# Patient Record
Sex: MALE | Race: BLACK OR AFRICAN AMERICAN | NOT HISPANIC OR LATINO | URBAN - METROPOLITAN AREA
[De-identification: names, ages, dates, MRNs, and addresses within clinical notes are randomized per-mention and may not be internally consistent; named-entity substitution may affect disease eponyms.]

---

## 2018-11-09 ENCOUNTER — EMERGENCY (EMERGENCY)
Facility: HOSPITAL | Age: 23
LOS: 0 days | Discharge: HOME | End: 2018-11-09
Attending: EMERGENCY MEDICINE | Admitting: EMERGENCY MEDICINE

## 2018-11-09 VITALS
DIASTOLIC BLOOD PRESSURE: 82 MMHG | HEART RATE: 72 BPM | TEMPERATURE: 98 F | RESPIRATION RATE: 18 BRPM | SYSTOLIC BLOOD PRESSURE: 124 MMHG | OXYGEN SATURATION: 100 %

## 2018-11-09 VITALS
TEMPERATURE: 97 F | SYSTOLIC BLOOD PRESSURE: 113 MMHG | HEART RATE: 66 BPM | OXYGEN SATURATION: 97 % | RESPIRATION RATE: 18 BRPM | DIASTOLIC BLOOD PRESSURE: 55 MMHG

## 2018-11-09 DIAGNOSIS — R56.9 UNSPECIFIED CONVULSIONS: ICD-10-CM

## 2018-11-09 LAB
ALBUMIN SERPL ELPH-MCNC: 4.4 G/DL — SIGNIFICANT CHANGE UP (ref 3.5–5.2)
ALP SERPL-CCNC: 50 U/L — SIGNIFICANT CHANGE UP (ref 30–115)
ALT FLD-CCNC: 12 U/L — SIGNIFICANT CHANGE UP (ref 0–41)
ANION GAP SERPL CALC-SCNC: 14 MMOL/L — SIGNIFICANT CHANGE UP (ref 7–14)
AST SERPL-CCNC: 21 U/L — SIGNIFICANT CHANGE UP (ref 0–41)
BASOPHILS # BLD AUTO: 0.02 K/UL — SIGNIFICANT CHANGE UP (ref 0–0.2)
BASOPHILS NFR BLD AUTO: 0.4 % — SIGNIFICANT CHANGE UP (ref 0–1)
BILIRUB SERPL-MCNC: 0.7 MG/DL — SIGNIFICANT CHANGE UP (ref 0.2–1.2)
BUN SERPL-MCNC: 11 MG/DL — SIGNIFICANT CHANGE UP (ref 10–20)
CALCIUM SERPL-MCNC: 9.9 MG/DL — SIGNIFICANT CHANGE UP (ref 8.5–10.1)
CHLORIDE SERPL-SCNC: 101 MMOL/L — SIGNIFICANT CHANGE UP (ref 98–110)
CO2 SERPL-SCNC: 27 MMOL/L — SIGNIFICANT CHANGE UP (ref 17–32)
CREAT SERPL-MCNC: 0.9 MG/DL — SIGNIFICANT CHANGE UP (ref 0.7–1.5)
EOSINOPHIL # BLD AUTO: 0.18 K/UL — SIGNIFICANT CHANGE UP (ref 0–0.7)
EOSINOPHIL NFR BLD AUTO: 3.3 % — SIGNIFICANT CHANGE UP (ref 0–8)
GLUCOSE SERPL-MCNC: 100 MG/DL — HIGH (ref 70–99)
HCT VFR BLD CALC: 39.7 % — LOW (ref 42–52)
HGB BLD-MCNC: 13.9 G/DL — LOW (ref 14–18)
IMM GRANULOCYTES NFR BLD AUTO: 0.4 % — HIGH (ref 0.1–0.3)
LYMPHOCYTES # BLD AUTO: 2.09 K/UL — SIGNIFICANT CHANGE UP (ref 1.2–3.4)
LYMPHOCYTES # BLD AUTO: 38.1 % — SIGNIFICANT CHANGE UP (ref 20.5–51.1)
MCHC RBC-ENTMCNC: 29 PG — SIGNIFICANT CHANGE UP (ref 27–31)
MCHC RBC-ENTMCNC: 35 G/DL — SIGNIFICANT CHANGE UP (ref 32–37)
MCV RBC AUTO: 82.7 FL — SIGNIFICANT CHANGE UP (ref 80–94)
MONOCYTES # BLD AUTO: 0.33 K/UL — SIGNIFICANT CHANGE UP (ref 0.1–0.6)
MONOCYTES NFR BLD AUTO: 6 % — SIGNIFICANT CHANGE UP (ref 1.7–9.3)
NEUTROPHILS # BLD AUTO: 2.84 K/UL — SIGNIFICANT CHANGE UP (ref 1.4–6.5)
NEUTROPHILS NFR BLD AUTO: 51.8 % — SIGNIFICANT CHANGE UP (ref 42.2–75.2)
NRBC # BLD: 0 /100 WBCS — SIGNIFICANT CHANGE UP (ref 0–0)
PLATELET # BLD AUTO: 204 K/UL — SIGNIFICANT CHANGE UP (ref 130–400)
POTASSIUM SERPL-MCNC: 4.4 MMOL/L — SIGNIFICANT CHANGE UP (ref 3.5–5)
POTASSIUM SERPL-SCNC: 4.4 MMOL/L — SIGNIFICANT CHANGE UP (ref 3.5–5)
PROT SERPL-MCNC: 6.9 G/DL — SIGNIFICANT CHANGE UP (ref 6–8)
RBC # BLD: 4.8 M/UL — SIGNIFICANT CHANGE UP (ref 4.7–6.1)
RBC # FLD: 11.9 % — SIGNIFICANT CHANGE UP (ref 11.5–14.5)
SODIUM SERPL-SCNC: 142 MMOL/L — SIGNIFICANT CHANGE UP (ref 135–146)
WBC # BLD: 5.48 K/UL — SIGNIFICANT CHANGE UP (ref 4.8–10.8)
WBC # FLD AUTO: 5.48 K/UL — SIGNIFICANT CHANGE UP (ref 4.8–10.8)

## 2018-11-09 RX ORDER — LEVETIRACETAM 250 MG/1
1 TABLET, FILM COATED ORAL
Qty: 28 | Refills: 0 | OUTPATIENT
Start: 2018-11-09 | End: 2018-11-22

## 2018-11-09 RX ORDER — LEVETIRACETAM 250 MG/1
1000 TABLET, FILM COATED ORAL ONCE
Qty: 0 | Refills: 0 | Status: COMPLETED | OUTPATIENT
Start: 2018-11-09 | End: 2018-11-09

## 2018-11-09 RX ADMIN — LEVETIRACETAM 400 MILLIGRAM(S): 250 TABLET, FILM COATED ORAL at 13:03

## 2018-11-09 NOTE — ED PROVIDER NOTE - NSFOLLOWUPINSTRUCTIONS_ED_ALL_ED_FT
Please take 500mg of Keppra twice a day and follow up with neurology this week. Remember, when you call for your appointment, mention that you were in the ER.       Seizure    A seizure is abnormal electrical activity in the brain; the specific cause may or may not be found. Prior to a seizure you may experience a warning sensation (aura) that may include fear, nausea, dizziness, and visual changes such as flashing lights of spots. Common symptoms during the seizure may include an altered mental status, rhythmic jerking movements, drooling, grunting, loss of bladder or bowel control, or tongue biting. After a seizure, you may feel confused and sleepy.     Do not swim, drive, operate machinery, or engage in any risky activity during which a seizure could cause further injury to you or others. Teach friends and family what to do if you HAVE a seizure which includes laying you on the ground with your head on a cushion and turning you to the side to keep your breathing passages clear in case of vomiting.    SEEK IMMEDIATE MEDICAL CARE IF YOU HAVE ANY OF THE FOLLOWING SYMPTOMS: seizure lasting over 5 minutes, not waking up or persistent altered mental status after the seizure, or more frequent or worsening seizures.

## 2018-11-09 NOTE — ED PROVIDER NOTE - CARE PROVIDER_API CALL
Blake Conway), Neurology; Neuromuscular Medicine  [address] [place] [id number]  Phone: [phone]  Fax: [phone]

## 2018-11-09 NOTE — ED PROVIDER NOTE - OBJECTIVE STATEMENT
23y M w [facility] intracranial mass resection in August found 2/2 headaches and without complication presents for new onset seizure this morning. Pt has sibling w CP/epilepsy so mother knows what seizures look like. Mother describes a generalized tonic clonic seizure that lasted approximately 4 minutes and resolves spontaneously. Pt very confused afterward. Pt was on keppra until the end of September and had no issues postoperatively. Keppra was stopped at the end of Sept. Currently on no medications. Now complaining of mild headache and fatigue.   No trauma. +tongue biting  unknown histology of mass.

## 2018-11-09 NOTE — ED ADULT TRIAGE NOTE - CHIEF COMPLAINT QUOTE
Pt BIBA from home s/p seizure witnessed by mom, pt s/p tumor removal in September, was on Kepra that was recently d/c

## 2018-11-09 NOTE — ED PROVIDER NOTE - ATTENDING CONTRIBUTION TO CARE
24 yo M with hx of brain mass (? type) removed in August, ws on antiepileptic but no longer, s/p witness GCT seizure this am. Pt with no sx hx. No fver, no neck pain. Currently pt reports HA and feeling tired. Otherwise w/o complaints. On exam well appearing, NCAT, CN intact, 5/5 though out, neck supple. Ct labs and admit

## 2018-11-09 NOTE — ED PROVIDER NOTE - PHYSICAL EXAMINATION
Constitutional: Well developed, well nourished. NAD. Good general hygiene  Head: Atraumatic.  Eyes: PERRLA. EOMI without discomfort.   ENT: No nasal discharge. Mucous membranes moist. Tongue biting.   Neck: Supple. Painless ROM.  Cardiovascular: Regular rhythm. Regular rate. Normal S1 and S2. No murmurs. 2+ pulses in all extremities.   Pulmonary: Normal respiratory rate and effort. Lungs clear to auscultation bilaterally. No wheezing, rales, or rhonchi. Bilateral, equal lung expansion.   Abdominal: Soft. Nondistended. Nontender. No rebound or guarding.   Extremities: Pelvis stable. No lower extremity edema. Symmetric calves.  Skin: No rashes.   Neuro: AAOx3. CN 2-12 intact. Strength 5/5 in all extremities. Normal gait. Normal sensation. Normal finger-to-nose. Normal rapid repeated movements. Normal heel-to-shin. No nystagmus. No drift. Romberg negative. No word slurring.   Psych: Normal mood. Normal affect.

## 2018-11-09 NOTE — ED ADULT NURSE NOTE - NSIMPLEMENTINTERV_GEN_ALL_ED
Implemented All Fall with Harm Risk Interventions:  [place] to call system. Call bell, personal items and telephone within reach. Instruct patient to call for assistance. Room bathroom lighting operational. Non-slip footwear when patient is off stretcher. Physically safe environment: no spills, clutter or unnecessary equipment. Stretcher in lowest position, wheels locked, appropriate side rails in place. Provide visual cue, wrist band, yellow gown, etc. Monitor gait and stability. Monitor for mental status changes and reorient to person, place, and time. Review medications for side effects contributing to fall risk. Reinforce activity limits and safety measures with patient and family. Provide visual clues: red socks.

## 2018-11-09 NOTE — ED PROVIDER NOTE - NS ED ROS FT
Constitutional: No fever or chills. Normal appetite. No unintended weight loss.   Eyes: No vision changes.  ENT: No hearing changes. No ear pain. No sore throat.  Neck: No neck pain or stiffness.  Cardiovascular: No chest pain, palpitations, or edema.  Pulmonary: No cough or SOB. No hemoptysis.  Abdominal: No abdominal pain, nausea, vomiting, or diarrhea.   : No dysuria or frequency. No hematuria.   Neuro: No dizziness.  MS: No back pain. No calf pain/swelling.  Psych: No suicidal or homicidal ideations.

## 2018-11-09 NOTE — ED ADULT NURSE NOTE - OBJECTIVE STATEMENT
Pt with HX- Brain tumor removal on september this year report one episode of seizures activity this morning ,pt state he is taking weed, pt  no seizures activity at this time A/O times 4. Pt with HX- Brain tumor removal on August 2018, this year report one episode of seizures activity this morning ,pt state he is taking weed, pt  no seizures activity at this time A/O times 4.

## 2018-12-12 ENCOUNTER — INPATIENT (INPATIENT)
Facility: HOSPITAL | Age: 23
LOS: 1 days | Discharge: HOME | End: 2018-12-14
Attending: INTERNAL MEDICINE | Admitting: INTERNAL MEDICINE

## 2018-12-12 VITALS
RESPIRATION RATE: 16 BRPM | TEMPERATURE: 97 F | WEIGHT: 154.32 LBS | HEIGHT: 70 IN | HEART RATE: 68 BPM | DIASTOLIC BLOOD PRESSURE: 56 MMHG | SYSTOLIC BLOOD PRESSURE: 118 MMHG

## 2018-12-12 DIAGNOSIS — Z98.890 OTHER SPECIFIED POSTPROCEDURAL STATES: Chronic | ICD-10-CM

## 2018-12-12 RX ORDER — LEVETIRACETAM 250 MG/1
500 TABLET, FILM COATED ORAL ONCE
Qty: 0 | Refills: 0 | Status: COMPLETED | OUTPATIENT
Start: 2018-12-12 | End: 2018-12-12

## 2018-12-12 RX ORDER — LEVETIRACETAM 250 MG/1
500 TABLET, FILM COATED ORAL EVERY 12 HOURS
Qty: 0 | Refills: 0 | Status: DISCONTINUED | OUTPATIENT
Start: 2018-12-12 | End: 2018-12-13

## 2018-12-12 RX ORDER — HEPARIN SODIUM 5000 [USP'U]/ML
5000 INJECTION INTRAVENOUS; SUBCUTANEOUS EVERY 8 HOURS
Qty: 0 | Refills: 0 | Status: DISCONTINUED | OUTPATIENT
Start: 2018-12-12 | End: 2018-12-14

## 2018-12-12 RX ADMIN — LEVETIRACETAM 500 MILLIGRAM(S): 250 TABLET, FILM COATED ORAL at 12:16

## 2018-12-12 RX ADMIN — LEVETIRACETAM 500 MILLIGRAM(S): 250 TABLET, FILM COATED ORAL at 22:26

## 2018-12-12 NOTE — CONSULT NOTE ADULT - SUBJECTIVE AND OBJECTIVE BOX
Neurology/Epilepsy Consult:    DANIELA ROSADO 23y Male  [id number]    Patient is a 23y old right-handed Male who presents for elective VEEG monitoring    HPI: History obtained from patient, EMR reviewed        PAST MEDICAL & SURGICAL HISTORY:  Bening tumor  S/p left frontal craniotomy 2018  Seizure  Asthma        FAMILY HISTORY:  No seizures      Social History:     Denies ETOH  Marijuana - daily        Risk factors:  Born full-term, , no complications  Normal growth and development  No febrile seizures/CNS infections  Several sport-related head traumas, no loss of consciousness      Allergy:  No Known Allergies        Home Medications:  Keppra 500mg q12hrs (last dose yesterday evening)      MEDICATIONS  (STANDING):  levETIRAcetam 500 milliGRAM(s) Oral every 12 hours    MEDICATIONS  (PRN):  LORazepam   Injectable 2 milliGRAM(s) IV Push three times a day PRN generalized tonic-clonic seizure lasting longer than 2 minutes          T(F): 97 (18 @ 11:26), Max: 97 (18 @ 11:26)  HR: 68 (18 @ 11:26) (68 - 68)  BP: 118/56 (18 @ 11:26) (118/56 - 118/56)  RR: 16 (--18 @ 11:26) (16 - 16)  SpO2: --      Neurologic Examination:  General:  Appearance is consistent with chronologic age.  No abnormal facies.   General: The patient is oriented to person, place, time and date.  Recent and remote memory intact.  Follows 4-step directions. Fund of knowledge is intact and normal.  Language with normal repetition, comprehension and naming.  Nondysarthric.    Cranial nerves: EOMI w/o nystagmus, skew or reported double vision.  PERRL.  No ptosis/weakness of eyelid closure.  Facial sensation is normal with normal bite.  No facial asymmetry.  Hearing grossly intact b/l.  Palate elevates midline.  Tongue midline.  Motor examination:   Normal tone, bulk and range of motion.  No tenderness, twitching, tremors or involuntary movements.  Formal Muscle Strength Testin/5 UE; 5/5 LE.  No observable drift.  Reflexes:   2+ b/l pectoralis, biceps, triceps, brachioradialis, patella and Achilles.    Sensory examination:   Intact to light touch and pinprick, pain.  Cerebellum:   FTN/HKS intact with normal ANDREE in all limbs.  No dysmetria or dysdiadokinesia.  Gait narrow based and normal.          Neuroimaging:  [facility]:   < from: CT Head No Cont (18 @ 10:30) >    IMPRESSION:    1.  Status post tumor resection in August as per history.    2.  Left frontal craniotomy, underlying hyperdensity which may partially   reflect postsurgical change, and hypoattenuation within the left frontal   lobe with a cystic lesion in the left frontal periventricular white   matter.    3.  These findings are indeterminate in the postsurgical state. Recommend   correlation with the patient's previous outside imaging studies. If these   can be made available for direct comparison an addendum will be issued as   appropriate.    ALLI MAHMOOD M.D., ATTENDING RADIOLOGIST  This document has been electronically signed. 2018 10:48AM    < end of copied text >        REEG 12/10/2018 - normal      Assessment:  This is a 23y Male with h/o left frontal craniotomy 2018 for benign tumor resection. Patient was off Keppra when had a witnessed seizure episode last month, Keppra was restarted.       Discussed with Dr. Wong      Plan:   - VEEG monitoring for better characterization and treatment plan  - Seizure precautions  - Keppra 500mg x 1 stat (did not take AM dose today) - ordered and given  - Continue Keppra 500mg q12hrs for now  - Ativan 2mg IV PRN for generalized tonic-clonic seizure lasting longer than 2 minutes, or two consecutive seizures without return to baseline in-between (ordered)  - CBC, CMP, Mg, Keppra level trough (ordered)  - Keep Mg above 2    Plan discussed with patient in details, all questions answered Neurology/Epilepsy Consult:    DANIELA ROSADO 23y Male  MRN-[id number]    Patient is a 23y old right-handed Male who presents for elective VEEG monitoring    HPI: History obtained from patient, EMR reviewed  In 2018 patient started having headaches that were persistent. He went to an urgent care and was Rx a medication for that (patient does not remember the name). Headaches were getting progressively worse, and mid-2018 patient went to [facility] ED where CT showed left mass. Patient underwent left frontal craniotomy for tumor resection that was found to be benign. He was put on Keppra 500mg q12hr prophylaxis that he continued taking as recommended until mid September. He was doing well postoperatively until 2018 when came to [facility] ED s/p witnessed seizure. That morning patient had GTC seizure out of sleep witnessed by mother. The seizure lasted 3-4 minutes and was followed by confusion. No incontinence, no Ward's paralysis reported. Patient recalls waking up in the ED feeling tired with headache. Patient's exam was reported as normal in the ED, CT head was done and showed post-surgical changes. Patient was re-started on Keppra 500mg q12hrs, and was recommended neurology follow up. He was evaluated by Dr. Wong last week and referred for in-patient VEEG monitoring. Patient states he was taking Keppra regularly since the seizure, took last dose yesterday evening.      PAST MEDICAL & SURGICAL HISTORY:  Bening tumor  S/p left frontal craniotomy 2018  Seizure  Asthma        FAMILY HISTORY:  Nephew - CP, epilepsy      Social History:     Denies ETOH  Marijuana - daily        Risk factors:  Born full-term, , no complications  Normal growth and development  No febrile seizures/CNS infections  Several sport-related head traumas, no loss of consciousness      Allergy:  No Known Allergies        Home Medications:  Keppra 500mg q12hrs (last dose yesterday evening)      MEDICATIONS  (STANDING):  levETIRAcetam 500 milliGRAM(s) Oral every 12 hours    MEDICATIONS  (PRN):  LORazepam   Injectable 2 milliGRAM(s) IV Push three times a day PRN generalized tonic-clonic seizure lasting longer than 2 minutes          T(F): 97 (18 @ 11:26), Max: 97 (18 @ 11:26)  HR: 68 (12-12-18 @ 11:26) (68 - 68)  BP: 118/56 (18 @ 11:26) (118/56 - 118/56)  RR: 16 (1218 @ 11:26) (16 - 16)  SpO2: --      Neurologic Examination:  General:  Appearance is consistent with chronologic age.  No abnormal facies.   General: The patient is oriented to person, place, time and date.  Recent and remote memory intact.  Follows 4-step directions. Fund of knowledge is intact and normal.  Language with normal repetition, comprehension and naming.  Nondysarthric.    Cranial nerves: EOMI w/o nystagmus, skew or reported double vision.  PERRL.  No ptosis/weakness of eyelid closure.  Facial sensation is normal with normal bite.  No facial asymmetry.  Hearing grossly intact b/l.  Palate elevates midline.  Tongue midline.  Motor examination:   Normal tone, bulk and range of motion.  No tenderness, twitching, tremors or involuntary movements.  Formal Muscle Strength Testin/5 UE; 5/5 LE.  No observable drift.  Reflexes:   2+ b/l pectoralis, biceps, triceps, brachioradialis, patella and Achilles.    Sensory examination:   Intact to light touch and pinprick, pain.  Cerebellum:   FTN/HKS intact with normal ANDREE in all limbs.  No dysmetria or dysdiadokinesia.  Gait narrow based and normal.          [facility]   < [facility]: CT Head No Cont (18 @ 10:30) >    IMPRESSION:    1.  Status post tumor resection in August as per history.    2.  Left frontal craniotomy, underlying hyperdensity which may partially   reflect postsurgical change, and hypoattenuation within the left frontal   lobe with a cystic lesion in the left frontal periventricular white   matter.    3.  These findings are indeterminate in the postsurgical state. Recommend   correlation with the patient's previous outside imaging studies. If these   can be made available for direct comparison an addendum will be issued as   appropriate.    ALLI MAHMOOD M.D., ATTENDING RADIOLOGIST  This document has been electronically signed. 2018 10:48AM    < end of copied text >        REEG 12/10/2018 - normal      Assessment:  This is a 23y Male with h/o left frontal craniotomy 2018 for benign tumor resection. Patient was off Keppra when had a witnessed seizure episode last month, Keppra was restarted.       Discussed with Dr. Wong      Plan:   - VEEG monitoring for better characterization and treatment plan  - Seizure precautions  - Keppra 500mg x 1 stat (did not take AM dose today) - ordered and given  - Continue Keppra 500mg q12hrs for now  - Ativan 2mg IV PRN for generalized tonic-clonic seizure lasting longer than 2 minutes, or two consecutive seizures without return to baseline in-between (ordered)  - CBC, CMP, Mg, Keppra level trough (ordered)  - Keep Mg above 2    Plan discussed with patient in details, all questions answered

## 2018-12-12 NOTE — H&P ADULT - ASSESSMENT
24 yo M with MHx of intracranial tumor, s/p craniotomy in 08/2018 at [facility] presented for evaluation of seizures. Pt had first episode of seizures in November, he was not on AED at that moment, but after that started on Keppra 500 mg BID. Pt was seen by neurologist in the clinic and referred for inpatient VEEG. Currently no complaints.     # Seizure  - VEEG  - cont Keppra 500 mg BID  - neurology eval  - monitor electrolytes    DVT ppx

## 2018-12-12 NOTE — CONSULT NOTE ADULT - CONSULT REASON
VEEG monitoring for better characterization and treatment plan VEEG monitoring for further  characterization / risk assessment and treatment plan

## 2018-12-12 NOTE — H&P ADULT - NSHPLABSRESULTS_GEN_ALL_CORE
no labs today.     CT brain 11/09/2018: IMPRESSION:    1.  Status post tumor resection in August as per history.    2.  Left frontal craniotomy, underlying hyperdensity which may partially   reflect postsurgical change, and hypoattenuation within the left frontal   lobe with a cystic lesion in the left frontal periventricular white   matter.    3.  These findings are indeterminate in the postsurgical state. Recommend   correlation with the patient's previous outside imaging studies. If these   can be made available for direct comparison an addendum will be issued as   appropriate.

## 2018-12-12 NOTE — H&P ADULT - NSHPPHYSICALEXAM_GEN_ALL_CORE
Physical exam:  GENERAL: NAD,   HEENT: PERRL  NECK: supple, no JVD  RESP: CTA b/l, no crackles, rhonchi  CVS: S1S2, RRR  GI: abdomen soft NT, ND  Extremities:  no c/c/edema  NEURO: AOx3, no focal deficit  H/L: no enlarged LN noted

## 2018-12-12 NOTE — H&P ADULT - HISTORY OF PRESENT ILLNESS
24 yo M with MHx of intracranial tumor, s/p craniotomy in 08/2018 at [facility] presented for evaluation of seizures. Pt had first episode of seizures in November, he was not on AED at that moment, but after that started on Keppra 500 mg BID. Pt was seen by neurologist in the clinic ad referred for inpatient VEEG. Currently no complaints.

## 2018-12-12 NOTE — CONSULT NOTE ADULT - ATTENDING COMMENTS
Agree with the history and plan  He does clearly have risk for recurrent seizure although he is not convinced  will start the monitoring  No change in AED  Seizure precaution

## 2018-12-13 PROBLEM — D49.6 NEOPLASM OF UNSPECIFIED BEHAVIOR OF BRAIN: Chronic | Status: ACTIVE | Noted: 2018-11-09

## 2018-12-13 LAB
ALBUMIN SERPL ELPH-MCNC: 4.6 G/DL — SIGNIFICANT CHANGE UP (ref 3.5–5.2)
ALP SERPL-CCNC: 41 U/L — SIGNIFICANT CHANGE UP (ref 30–115)
ALT FLD-CCNC: 11 U/L — SIGNIFICANT CHANGE UP (ref 0–41)
ANION GAP SERPL CALC-SCNC: 11 MMOL/L — SIGNIFICANT CHANGE UP (ref 7–14)
AST SERPL-CCNC: 16 U/L — SIGNIFICANT CHANGE UP (ref 0–41)
BASOPHILS # BLD AUTO: 0.02 K/UL — SIGNIFICANT CHANGE UP (ref 0–0.2)
BASOPHILS NFR BLD AUTO: 0.4 % — SIGNIFICANT CHANGE UP (ref 0–1)
BILIRUB SERPL-MCNC: 1.2 MG/DL — SIGNIFICANT CHANGE UP (ref 0.2–1.2)
BUN SERPL-MCNC: 12 MG/DL — SIGNIFICANT CHANGE UP (ref 10–20)
CALCIUM SERPL-MCNC: 9.8 MG/DL — SIGNIFICANT CHANGE UP (ref 8.5–10.1)
CHLORIDE SERPL-SCNC: 103 MMOL/L — SIGNIFICANT CHANGE UP (ref 98–110)
CO2 SERPL-SCNC: 27 MMOL/L — SIGNIFICANT CHANGE UP (ref 17–32)
CREAT SERPL-MCNC: 1 MG/DL — SIGNIFICANT CHANGE UP (ref 0.7–1.5)
EOSINOPHIL # BLD AUTO: 0.11 K/UL — SIGNIFICANT CHANGE UP (ref 0–0.7)
EOSINOPHIL NFR BLD AUTO: 2.4 % — SIGNIFICANT CHANGE UP (ref 0–8)
GLUCOSE SERPL-MCNC: 87 MG/DL — SIGNIFICANT CHANGE UP (ref 70–99)
HCT VFR BLD CALC: 43 % — SIGNIFICANT CHANGE UP (ref 42–52)
HGB BLD-MCNC: 15 G/DL — SIGNIFICANT CHANGE UP (ref 14–18)
IMM GRANULOCYTES NFR BLD AUTO: 0.2 % — SIGNIFICANT CHANGE UP (ref 0.1–0.3)
LYMPHOCYTES # BLD AUTO: 2.64 K/UL — SIGNIFICANT CHANGE UP (ref 1.2–3.4)
LYMPHOCYTES # BLD AUTO: 58.3 % — HIGH (ref 20.5–51.1)
MAGNESIUM SERPL-MCNC: 2.1 MG/DL — SIGNIFICANT CHANGE UP (ref 1.8–2.4)
MCHC RBC-ENTMCNC: 29.2 PG — SIGNIFICANT CHANGE UP (ref 27–31)
MCHC RBC-ENTMCNC: 34.9 G/DL — SIGNIFICANT CHANGE UP (ref 32–37)
MCV RBC AUTO: 83.7 FL — SIGNIFICANT CHANGE UP (ref 80–94)
MONOCYTES # BLD AUTO: 0.22 K/UL — SIGNIFICANT CHANGE UP (ref 0.1–0.6)
MONOCYTES NFR BLD AUTO: 4.9 % — SIGNIFICANT CHANGE UP (ref 1.7–9.3)
NEUTROPHILS # BLD AUTO: 1.53 K/UL — SIGNIFICANT CHANGE UP (ref 1.4–6.5)
NEUTROPHILS NFR BLD AUTO: 33.8 % — LOW (ref 42.2–75.2)
NRBC # BLD: 0 /100 WBCS — SIGNIFICANT CHANGE UP (ref 0–0)
PLATELET # BLD AUTO: 197 K/UL — SIGNIFICANT CHANGE UP (ref 130–400)
POTASSIUM SERPL-MCNC: 4.4 MMOL/L — SIGNIFICANT CHANGE UP (ref 3.5–5)
POTASSIUM SERPL-SCNC: 4.4 MMOL/L — SIGNIFICANT CHANGE UP (ref 3.5–5)
PROT SERPL-MCNC: 6.6 G/DL — SIGNIFICANT CHANGE UP (ref 6–8)
RBC # BLD: 5.14 M/UL — SIGNIFICANT CHANGE UP (ref 4.7–6.1)
RBC # FLD: 12 % — SIGNIFICANT CHANGE UP (ref 11.5–14.5)
SODIUM SERPL-SCNC: 141 MMOL/L — SIGNIFICANT CHANGE UP (ref 135–146)
WBC # BLD: 4.53 K/UL — LOW (ref 4.8–10.8)
WBC # FLD AUTO: 4.53 K/UL — LOW (ref 4.8–10.8)

## 2018-12-13 RX ORDER — LEVETIRACETAM 250 MG/1
250 TABLET, FILM COATED ORAL EVERY 12 HOURS
Qty: 0 | Refills: 0 | Status: DISCONTINUED | OUTPATIENT
Start: 2018-12-13 | End: 2018-12-14

## 2018-12-13 RX ADMIN — LEVETIRACETAM 250 MILLIGRAM(S): 250 TABLET, FILM COATED ORAL at 21:00

## 2018-12-13 RX ADMIN — LEVETIRACETAM 250 MILLIGRAM(S): 250 TABLET, FILM COATED ORAL at 10:17

## 2018-12-13 NOTE — PROGRESS NOTE ADULT - SUBJECTIVE AND OBJECTIVE BOX
Epilepsy Attending Note:     DANIELA ROSADO    23y Male  MRN [id number]    Vital Signs Last 24 Hrs  T(C): 36.3 (13 Dec 2018 05:13), Max: 36.9 (12 Dec 2018 22:12)  T(F): 97.3 (13 Dec 2018 05:13), Max: 98.4 (12 Dec 2018 22:12)  HR: 54 (13 Dec 2018 05:13) (52 - 68)  BP: 107/53 (13 Dec 2018 05:13) (107/53 - 122/73)  BP(mean): --  RR: 16 (13 Dec 2018 05:13) (16 - 16)  SpO2: --                          15.0   4.53  )-----------( 197      ( 13 Dec 2018 07:42 )             43.0       12-13    141  |  103  |  12  ----------------------------<  87  4.4   |  27  |  1.0    Ca    9.8      13 Dec 2018 07:42  Mg     2.1     12-13    TPro  6.6  /  Alb  4.6  /  TBili  1.2  /  DBili  x   /  AST  16  /  ALT  11  /  AlkPhos  41  12-13      MEDICATIONS  (STANDING):  heparin  Injectable 5000 Unit(s) SubCutaneous every 8 hours  levETIRAcetam 250 milliGRAM(s) Oral every 12 hours    MEDICATIONS  (PRN):  LORazepam   Injectable 2 milliGRAM(s) IV Push three times a day PRN generalized tonic-clonic seizure lasting longer than 2 minutes            VEEG in the last 24 hours:    Background---8-9    Focal and generalized slowing---left FC/anterior quadrant focal slowing    Interictal activity---left FC sharp transients    Events--none    Seizures--none    Impression:  abnormal due to the above    Plan - decrease Keppra to 250 bid   Seizure precaution

## 2018-12-13 NOTE — PROGRESS NOTE ADULT - SUBJECTIVE AND OBJECTIVE BOX
DANIELA ROSADO    Patient is a 23y old  Male who presents with a chief complaint of VEEG (13 Dec 2018 09:14)    HPI:  22 yo M with MHx of intracranial tumor, s/p craniotomy in 08/2018 at [facility] presented for evaluation of seizures. Pt had first episode of seizures in November, he was not on AED at that moment, but after that started on Keppra 500 mg BID. Pt was seen by neurologist in the clinic ad referred for inpatient VEEG. Currently no complaints. (12 Dec 2018 12:42)    INTERVAL HPI/OVERNIGHT EVENTS: no events    ROS: All ROS negative     PHYSICAL EXAM:  T(C): 36.4, Max: 36.9 (12-12-18 @ 22:12)  HR: 53 (52 - 54)  BP: 116/53 (107/53 - 122/73)  RR: 14 (14 - 16)  SpO2: --    GENERAL: NAD  NECK: Supple, No JVD  PULMONARY/CHEST: No rales, rhonchi, wheezing  CARDIOVASC: Regular rate and rhythm; No murmurs  GI/ABDOMEN: Soft, Nontender, Nondistended; Bowel sounds present  EXTREMITIES:  2+ Peripheral Pulses, No clubbing, cyanosis, or edema, no deformity. No calf tenderness b/l.  SKIN: No rashes or lesions  NERVOUS SYSTEM:  Alert & Oriented X3, no focal deficit     Consultant(s) Notes Reviewed by me.   Care Discussed with Consultants/Other Providers     LABS:                        15.0   4.53  )-----------( 197      ( 13 Dec 2018 07:42 )             43.0     12-13    141  |  103  |  12  ----------------------------<  87  4.4   |  27  |  1.0    Ca    9.8      13 Dec 2018 07:42  Mg     2.1     12-13    TPro  6.6  /  Alb  4.6  /  TBili  1.2  /  DBili  x   /  AST  16  /  ALT  11  /  AlkPhos  41  12-13        MEDICATIONS  (STANDING):  heparin  Injectable 5000 Unit(s) SubCutaneous every 8 hours  levETIRAcetam 250 milliGRAM(s) Oral every 12 hours    MEDICATIONS  (PRN):  LORazepam   Injectable 2 milliGRAM(s) IV Push three times a day PRN generalized tonic-clonic seizure lasting longer than 2 minutes

## 2018-12-13 NOTE — PROGRESS NOTE ADULT - ASSESSMENT
24 yo M with MHx of intracranial tumor, s/p craniotomy in 08/2018 at [facility] presented for evaluation of seizures. Pt had first episode of seizures in November, he was not on AED at that moment, but after that started on Keppra 500 mg BID. Pt was seen by neurologist in the clinic and referred for inpatient VEEG. Currently no complaints.     # Seizure  - VEEG continue  - Keppra decreased to 250 BID  - neurology on board  - monitor electrolytes  - seizure precautions    DVT ppx

## 2018-12-14 ENCOUNTER — TRANSCRIPTION ENCOUNTER (OUTPATIENT)
Age: 23
End: 2018-12-14

## 2018-12-14 VITALS
TEMPERATURE: 96 F | DIASTOLIC BLOOD PRESSURE: 51 MMHG | RESPIRATION RATE: 16 BRPM | HEART RATE: 58 BPM | SYSTOLIC BLOOD PRESSURE: 102 MMHG

## 2018-12-14 RX ORDER — LEVETIRACETAM 250 MG/1
500 TABLET, FILM COATED ORAL EVERY 12 HOURS
Qty: 0 | Refills: 0 | Status: DISCONTINUED | OUTPATIENT
Start: 2018-12-14 | End: 2018-12-14

## 2018-12-14 RX ORDER — LEVETIRACETAM 250 MG/1
1 TABLET, FILM COATED ORAL
Qty: 60 | Refills: 5
Start: 2018-12-14 | End: 2019-06-11

## 2018-12-14 RX ADMIN — LEVETIRACETAM 500 MILLIGRAM(S): 250 TABLET, FILM COATED ORAL at 10:37

## 2018-12-14 NOTE — PROGRESS NOTE ADULT - SUBJECTIVE AND OBJECTIVE BOX
Epilepsy NP Note:    Follow up neurology appointment is scheduled with Dr. Wong  for February 19, 2019 at 2:20 pm.    [address] [place] [address], [address]  [phone]    Rx for Keppra 500mg q12hrs sent to the pharmacy.    Also given Rx for blood test to be done prior to follow up (CBC, CMP, Keppra level trough).    Information is given to the patient and hospitalist.

## 2018-12-14 NOTE — PROGRESS NOTE ADULT - SUBJECTIVE AND OBJECTIVE BOX
Epilepsy Attending Note:     DANIELA ROSADO    23y Male  MRN [id number]    Vital Signs Last 24 Hrs  T(C): 35.8 (14 Dec 2018 05:42), Max: 36.7 (13 Dec 2018 22:09)  T(F): 96.5 (14 Dec 2018 05:42), Max: 98.1 (13 Dec 2018 22:09)  HR: 58 (14 Dec 2018 05:42) (53 - 58)  BP: 102/51 (14 Dec 2018 05:42) (102/51 - 151/69)  BP(mean): --  RR: 16 (14 Dec 2018 05:42) (14 - 16)  SpO2: --                          15.0   4.53  )-----------( 197      ( 13 Dec 2018 07:42 )             43.0       12-13    141  |  103  |  12  ----------------------------<  87  4.4   |  27  |  1.0    Ca    9.8      13 Dec 2018 07:42  Mg     2.1     12-13    TPro  6.6  /  Alb  4.6  /  TBili  1.2  /  DBili  x   /  AST  16  /  ALT  11  /  AlkPhos  41  12-13      MEDICATIONS  (STANDING):  heparin  Injectable 5000 Unit(s) SubCutaneous every 8 hours  levETIRAcetam 500 milliGRAM(s) Oral every 12 hours    MEDICATIONS  (PRN):  LORazepam   Injectable 2 milliGRAM(s) IV Push three times a day PRN generalized tonic-clonic seizure lasting longer than 2 minutes            VEEG in the last 24 hours:    Background---------8-9 hz    Focal and generalized slowing---left anterior quadrants slowing    Interictal activity---small number of left FC sharps    Events---none    Seizures--none    Impression:-- abnormal as above    Plan - continue Keppra at 500 bid           F/u with levels

## 2018-12-14 NOTE — DISCHARGE NOTE ADULT - CARE PROVIDERS DIRECT ADDRESSES
,[email or web address],DirectAddress_Unknown ,[email or web address],DirectAddress_Unknown,DirectAddress_Unknown

## 2018-12-14 NOTE — DISCHARGE NOTE ADULT - PROVIDER TOKENS
TOKEN:'[id number]',FREE:[LAST:[pmd],PHONE:[(   )    -],FAX:[(   )    -]] TOKEN:'[id number]',FREE:[LAST:[pmd],PHONE:[(   )    -],FAX:[(   )    -]],FREE:[LAST:[Dr. Alvarado, neurosurgery at [facility]],PHONE:[(   )    -],FAX:[(   )    -]]

## 2018-12-14 NOTE — PROGRESS NOTE ADULT - SUBJECTIVE AND OBJECTIVE BOX
DANIELA ROSADO    Patient is a 23y old  Male who presents with a chief complaint of VEEG (14 Dec 2018 11:18)    HPI:  24 yo M with MHx of intracranial tumor, s/p craniotomy in 08/2018 at [facility] presented for evaluation of seizures. Pt had first episode of seizures in November, he was not on AED at that moment, but after seizure started on Keppra 500 mg BID. Pt was seen by neurologist in the clinic ad referred for inpatient VEEG. Currently no complaints. (12 Dec 2018 12:42)    INTERVAL HPI/OVERNIGHT EVENTS: no events    ROS: All ROS negative    PHYSICAL EXAM:  T(C): 35.8, Max: 36.7 (12-13-18 @ 22:09)  HR: 58 (53 - 58)  BP: 102/51 (102/51 - 151/69)  RR: 16 (14 - 16)  SpO2: --    GENERAL: NAD  NECK: Supple, No JVD  PULMONARY/CHEST: No rales, rhonchi, wheezing  CARDIOVASC: Regular rate and rhythm; No murmurs  GI/ABDOMEN: Soft, Nontender, Nondistended; Bowel sounds present  EXTREMITIES:  2+ Peripheral Pulses, No clubbing, cyanosis, or edema, no deformity. No calf tenderness b/l.  SKIN: No rashes or lesions  NERVOUS SYSTEM:  Alert & Oriented X3, no focal deficit     Consultant(s) Notes Reviewed by me.   Care Discussed with Consultants/Other Providers     LABS: no new labs today    MEDICATIONS  (STANDING):  heparin  Injectable 5000 Unit(s) SubCutaneous every 8 hours  levETIRAcetam 500 milliGRAM(s) Oral every 12 hours    MEDICATIONS  (PRN):  LORazepam   Injectable 2 milliGRAM(s) IV Push three times a day PRN generalized tonic-clonic seizure lasting longer than 2 minutes

## 2018-12-14 NOTE — DISCHARGE NOTE ADULT - HOSPITAL COURSE
24 yo M with MHx of intracranial tumor, s/p craniotomy in 08/2018 at [facility] presented for evaluation of seizures. Pt had first episode of seizures in November, he was not on AED at that moment, but after seizure episode  was started on Keppra 500 mg BID. Pt was seen by neurologist in the clinic ad referred for inpatient VEEG.   VEEG completed, Keppra kept at dose of 500 mg BID.  Pt is clinically stable for discharge home, medications sent to his pharmacy by neurology PA, appointment to neurologist made. Pt advised to follow up with PMD.

## 2018-12-14 NOTE — DISCHARGE NOTE ADULT - CARE PLAN
Principal Discharge DX:	Seizure  Goal:	control of symptoms  Assessment and plan of treatment:	continue Keppra, follow up with Dr. Wong on February 19, 2019 at 2:20 pm.

## 2018-12-14 NOTE — DISCHARGE NOTE ADULT - CARE PROVIDER_API CALL
Ray Wong), Neurology  [address] [place] [address]  Phone: [phone]  Fax: [phone]    pmd,   Phone: (   )    -  Fax: (   )    - Ray Wong), Neurology  [address] [place] [address]  [address] [place] [address]  Phone: [phone]  Fax: [phone]    pmd,   Phone: (   )    -  Fax: (   )    -    Dr. Alvarado, neurosurgery at [facility],   Phone: (   )    -  Fax: (   )    -

## 2018-12-14 NOTE — DISCHARGE NOTE ADULT - MEDICATION SUMMARY - MEDICATIONS TO TAKE
I will START or STAY ON the medications listed below when I get home from the hospital:    Keppra 500 mg oral tablet  -- 1 tab(s) by mouth every 12 hours   -- Check with your doctor before becoming pregnant.  It is very important that you take or use this exactly as directed.  Do not skip doses or discontinue unless directed by your doctor.  May cause drowsiness or dizziness.  Obtain medical advice before taking any non-prescription drugs as some may affect the action of this medication.  Swallow whole.  Do not crush.  This drug may impair the ability to drive or operate machinery.  Use care until you become familiar with its effects.    -- Indication: For Seizure

## 2018-12-14 NOTE — DISCHARGE NOTE ADULT - PATIENT PORTAL LINK FT
You can access the [facility] Patient Portal, offered by [facility], by registering with the following website: [email or web address]

## 2018-12-14 NOTE — DISCHARGE NOTE ADULT - MEDICATION SUMMARY - MEDICATIONS TO STOP TAKING
I will STOP taking the medications listed below when I get home from the hospital:    levETIRAcetam 500 mg oral tablet  -- 1 tab(s) by mouth 2 times a day   -- Check with your doctor before becoming pregnant.  It is very important that you take or use this exactly as directed.  Do not skip doses or discontinue unless directed by your doctor.  May cause drowsiness or dizziness.  Obtain medical advice before taking any non-prescription drugs as some may affect the action of this medication.  Swallow whole.  Do not crush.  This drug may impair the ability to drive or operate machinery.  Use care until you become familiar with its effects.

## 2018-12-14 NOTE — PROGRESS NOTE ADULT - ASSESSMENT
24 yo M with MHx of intracranial tumor, s/p craniotomy in 08/2018 at [facility] presented for evaluation of seizures. Pt had first episode of seizures in November, he was not on AED at that moment, but after that started on Keppra 500 mg BID. Pt was seen by neurologist in the clinic and referred for inpatient VEEG. Currently no complaints.     # Seizure  - VEEG completed, reviewed by neurologist, Keppra increased to 500 mg BID, Rx sent to the pharmacy    pt is clinically stable for discharge home today with OP follow up with neurologist, his PMD and neurosurgeon in [facility]

## 2018-12-15 LAB — LEVETIRACETAM SERPL-MCNC: 8.7 MCG/ML — LOW (ref 12–46)

## 2018-12-18 DIAGNOSIS — G40.909 EPILEPSY, UNSPECIFIED, NOT INTRACTABLE, WITHOUT STATUS EPILEPTICUS: ICD-10-CM

## 2018-12-18 DIAGNOSIS — Z86.011 PERSONAL HISTORY OF BENIGN NEOPLASM OF THE BRAIN: ICD-10-CM

## 2018-12-18 DIAGNOSIS — J45.20 MILD INTERMITTENT ASTHMA, UNCOMPLICATED: ICD-10-CM

## 2018-12-18 DIAGNOSIS — Z28.21 IMMUNIZATION NOT CARRIED OUT BECAUSE OF PATIENT REFUSAL: ICD-10-CM

## 2019-05-09 PROBLEM — R56.9 UNSPECIFIED CONVULSIONS: Chronic | Status: ACTIVE | Noted: 2018-12-12

## 2019-05-09 PROBLEM — J45.20 MILD INTERMITTENT ASTHMA, UNCOMPLICATED: Chronic | Status: ACTIVE | Noted: 2018-12-12

## 2019-05-28 PROBLEM — Z00.00 ENCOUNTER FOR PREVENTIVE HEALTH EXAMINATION: Status: ACTIVE | Noted: 2019-05-28

## 2019-08-14 ENCOUNTER — APPOINTMENT (OUTPATIENT)
Dept: INTERNAL MEDICINE | Facility: CLINIC | Age: 24
End: 2019-08-14

## 2019-08-20 ENCOUNTER — APPOINTMENT (OUTPATIENT)
Dept: NEUROLOGY | Facility: CLINIC | Age: 24
End: 2019-08-20

## 2019-11-18 ENCOUNTER — EMERGENCY (EMERGENCY)
Facility: HOSPITAL | Age: 24
LOS: 0 days | Discharge: HOME | End: 2019-11-19
Attending: EMERGENCY MEDICINE | Admitting: EMERGENCY MEDICINE
Payer: COMMERCIAL

## 2019-11-18 VITALS
WEIGHT: 139.99 LBS | HEART RATE: 74 BPM | TEMPERATURE: 98 F | OXYGEN SATURATION: 99 % | RESPIRATION RATE: 18 BRPM | HEIGHT: 70 IN | DIASTOLIC BLOOD PRESSURE: 72 MMHG | SYSTOLIC BLOOD PRESSURE: 128 MMHG

## 2019-11-18 DIAGNOSIS — Z98.890 OTHER SPECIFIED POSTPROCEDURAL STATES: Chronic | ICD-10-CM

## 2019-11-18 DIAGNOSIS — R51 HEADACHE: ICD-10-CM

## 2019-11-18 DIAGNOSIS — H57.89 OTHER SPECIFIED DISORDERS OF EYE AND ADNEXA: ICD-10-CM

## 2019-11-18 DIAGNOSIS — R09.81 NASAL CONGESTION: ICD-10-CM

## 2019-11-18 PROCEDURE — 99284 EMERGENCY DEPT VISIT MOD MDM: CPT

## 2019-11-19 VITALS
HEART RATE: 70 BPM | OXYGEN SATURATION: 98 % | RESPIRATION RATE: 20 BRPM | DIASTOLIC BLOOD PRESSURE: 70 MMHG | SYSTOLIC BLOOD PRESSURE: 124 MMHG

## 2019-11-19 LAB
ALBUMIN SERPL ELPH-MCNC: 4.9 G/DL — SIGNIFICANT CHANGE UP (ref 3.5–5.2)
ALP SERPL-CCNC: 53 U/L — SIGNIFICANT CHANGE UP (ref 30–115)
ALT FLD-CCNC: 10 U/L — SIGNIFICANT CHANGE UP (ref 0–41)
ANION GAP SERPL CALC-SCNC: 12 MMOL/L — SIGNIFICANT CHANGE UP (ref 7–14)
AST SERPL-CCNC: 15 U/L — SIGNIFICANT CHANGE UP (ref 0–41)
BASOPHILS # BLD AUTO: 0.03 K/UL — SIGNIFICANT CHANGE UP (ref 0–0.2)
BASOPHILS NFR BLD AUTO: 0.5 % — SIGNIFICANT CHANGE UP (ref 0–1)
BILIRUB SERPL-MCNC: 0.7 MG/DL — SIGNIFICANT CHANGE UP (ref 0.2–1.2)
BUN SERPL-MCNC: 17 MG/DL — SIGNIFICANT CHANGE UP (ref 10–20)
CALCIUM SERPL-MCNC: 9.7 MG/DL — SIGNIFICANT CHANGE UP (ref 8.5–10.1)
CHLORIDE SERPL-SCNC: 103 MMOL/L — SIGNIFICANT CHANGE UP (ref 98–110)
CO2 SERPL-SCNC: 27 MMOL/L — SIGNIFICANT CHANGE UP (ref 17–32)
CREAT SERPL-MCNC: 1.1 MG/DL — SIGNIFICANT CHANGE UP (ref 0.7–1.5)
EOSINOPHIL # BLD AUTO: 0.13 K/UL — SIGNIFICANT CHANGE UP (ref 0–0.7)
EOSINOPHIL NFR BLD AUTO: 2.3 % — SIGNIFICANT CHANGE UP (ref 0–8)
GLUCOSE SERPL-MCNC: 89 MG/DL — SIGNIFICANT CHANGE UP (ref 70–99)
HCT VFR BLD CALC: 39.8 % — LOW (ref 42–52)
HGB BLD-MCNC: 13.9 G/DL — LOW (ref 14–18)
IMM GRANULOCYTES NFR BLD AUTO: 0 % — LOW (ref 0.1–0.3)
LYMPHOCYTES # BLD AUTO: 3.52 K/UL — HIGH (ref 1.2–3.4)
LYMPHOCYTES # BLD AUTO: 62.1 % — HIGH (ref 20.5–51.1)
MCHC RBC-ENTMCNC: 30.2 PG — SIGNIFICANT CHANGE UP (ref 27–31)
MCHC RBC-ENTMCNC: 34.9 G/DL — SIGNIFICANT CHANGE UP (ref 32–37)
MCV RBC AUTO: 86.3 FL — SIGNIFICANT CHANGE UP (ref 80–94)
MONOCYTES # BLD AUTO: 0.29 K/UL — SIGNIFICANT CHANGE UP (ref 0.1–0.6)
MONOCYTES NFR BLD AUTO: 5.1 % — SIGNIFICANT CHANGE UP (ref 1.7–9.3)
NEUTROPHILS # BLD AUTO: 1.7 K/UL — SIGNIFICANT CHANGE UP (ref 1.4–6.5)
NEUTROPHILS NFR BLD AUTO: 30 % — LOW (ref 42.2–75.2)
NRBC # BLD: 0 /100 WBCS — SIGNIFICANT CHANGE UP (ref 0–0)
PLATELET # BLD AUTO: 165 K/UL — SIGNIFICANT CHANGE UP (ref 130–400)
POTASSIUM SERPL-MCNC: 4.7 MMOL/L — SIGNIFICANT CHANGE UP (ref 3.5–5)
POTASSIUM SERPL-SCNC: 4.7 MMOL/L — SIGNIFICANT CHANGE UP (ref 3.5–5)
PROT SERPL-MCNC: 7 G/DL — SIGNIFICANT CHANGE UP (ref 6–8)
RBC # BLD: 4.61 M/UL — LOW (ref 4.7–6.1)
RBC # FLD: 11.9 % — SIGNIFICANT CHANGE UP (ref 11.5–14.5)
SODIUM SERPL-SCNC: 142 MMOL/L — SIGNIFICANT CHANGE UP (ref 135–146)
WBC # BLD: 5.67 K/UL — SIGNIFICANT CHANGE UP (ref 4.8–10.8)
WBC # FLD AUTO: 5.67 K/UL — SIGNIFICANT CHANGE UP (ref 4.8–10.8)

## 2019-11-19 PROCEDURE — 70450 CT HEAD/BRAIN W/O DYE: CPT | Mod: 26

## 2019-11-19 RX ORDER — SODIUM CHLORIDE 9 MG/ML
1000 INJECTION, SOLUTION INTRAVENOUS ONCE
Refills: 0 | Status: COMPLETED | OUTPATIENT
Start: 2019-11-19 | End: 2019-11-19

## 2019-11-19 RX ORDER — METOCLOPRAMIDE HCL 10 MG
10 TABLET ORAL ONCE
Refills: 0 | Status: COMPLETED | OUTPATIENT
Start: 2019-11-19 | End: 2019-11-19

## 2019-11-19 RX ORDER — ACETAMINOPHEN 500 MG
650 TABLET ORAL ONCE
Refills: 0 | Status: COMPLETED | OUTPATIENT
Start: 2019-11-19 | End: 2019-11-19

## 2019-11-19 RX ADMIN — SODIUM CHLORIDE 1000 MILLILITER(S): 9 INJECTION, SOLUTION INTRAVENOUS at 01:24

## 2019-11-19 RX ADMIN — Medication 10 MILLIGRAM(S): at 01:30

## 2019-11-19 RX ADMIN — Medication 650 MILLIGRAM(S): at 03:00

## 2019-11-19 RX ADMIN — SODIUM CHLORIDE 1000 MILLILITER(S): 9 INJECTION, SOLUTION INTRAVENOUS at 03:00

## 2019-11-19 RX ADMIN — Medication 650 MILLIGRAM(S): at 01:31

## 2019-11-19 NOTE — ED PROVIDER NOTE - NS ED ROS FT
Eyes:  No visual changes, eye pain or discharge.  ENMT:  No hearing changes, pain, discharge or infections. No neck pain or stiffness.  Cardiac:  No chest pain, SOB or edema. No chest pain with exertion.  Respiratory:  No cough or respiratory distress. No hemoptysis. No history of asthma or RAD.  GI:  No nausea, vomiting, diarrhea or abdominal pain.  :  No dysuria, frequency or burning.  MS:  No myalgia, muscle weakness, joint pain or back pain.  Neuro: +headache. No weakness.  No LOC.  Skin:  No skin rash.   Endocrine: No history of thyroid disease or diabetes.

## 2019-11-19 NOTE — ED PROVIDER NOTE - ATTENDING CONTRIBUTION TO CARE
25 yo male with PMH cranial mass s/p craniectomy presented c/o headache. Pt c/o mild right sided headache x several days. Denied any focal weakness, paresthesias, visual changes or dizziness.  Associated with nasal congestion and eye tearing. Minimal relief with OTC meds.      VITAL SIGNS: noted  CONSTITUTIONAL: Well-developed; well-nourished; in no acute distress  HEAD: Normocephalic; atraumatic  EYES: PERRL, EOM intact; conjunctiva and sclera clear  ENT: No nasal discharge; airway clear. MMM  NECK: Supple; non tender. No anterior cervical lymphadenopathy noted  CARD: S1, S2 normal; no murmurs, gallops, or rubs. Regular rate and rhythm  RESP: CTAB/L, no wheezes, rales or rhonchi  ABD: Normal bowel sounds; soft; non-distended; non-tender; no hepatosplenomegaly. No CVA tenderness  EXT: Normal ROM. No calf tenderness or edema. Distal pulses intact  NEURO: AAO x 3, normal speech, no facial asymmetry, negative pronator drift, no ataxia, negative Romberg, no dysdiadokinesia, no nystagmus, sensory equal and intact.   SKIN: Skin exam is warm and dry   MS: No midline spinal tenderness

## 2019-11-19 NOTE — ED PROVIDER NOTE - PATIENT PORTAL LINK FT
You can access the FollowMyHealth Patient Portal offered by [facility] by registering at the following website: [email or web address]. By joining [employer]’s FollowMyHealth portal, you will also be able to view your health information using other applications (apps) compatible with our system.

## 2019-11-19 NOTE — ED PROVIDER NOTE - OBJECTIVE STATEMENT
24 y m pmh cranial mass s/p craniectomy 1 year ago pw headache. Achy, mild right sided headache for the past few days. Associated with nasal congestion and eye tearing. Tried tylenol at home with minimal relief. Denies fever, chills, n/v, back pain, neck pain, head trauma, cp, sob, seizure like activity.

## 2019-11-19 NOTE — ED PROVIDER NOTE - PHYSICAL EXAMINATION
CONSTITUTIONAL: Well-developed; well-nourished; in no acute distress.   SKIN: warm, dry  HEAD: Normocephalic; atraumatic.  EYES: PERRL, EOMI, normal sclera and conjunctiva   ENT: No nasal discharge; airway clear.  NECK: Supple; non tender.  CARD: S1, S2 normal; no murmurs, gallops, or rubs. Regular rate and rhythm.   RESP: No wheezes, rales or rhonchi.  ABD: soft ntnd  EXT: Normal ROM.  No clubbing, cyanosis or edema.   LYMPH: No acute cervical adenopathy.  NEURO: Alert, oriented, grossly unremarkable. No cranial nerve deficits, moving all extremities with normal strength/sensation. Ambulating with normal, steady gait. No speech deficits. Cerebellar movements intact.   PSYCH: Cooperative, appropriate.

## 2019-11-19 NOTE — ED PROVIDER NOTE - NSFOLLOWUPINSTRUCTIONS_ED_ALL_ED_FT
FOLLOW UP WITH YOUR DOCTOR THIS WEEK FOR REEVALUATION. [facility]ISE NEUROLOGY FOLLOW UP AS WELL.    RETURN TO ED IMMEDIATELY WITH ANY WORSENING SYMPTOMS, CHEST PAIN, SHORTNESS OF BREATH, FEVERS, WEAKNESS, DIZZINESS, PERSISTENT OR SEVERE HEADACHE OR ANY OTHER CONCERNS.     Headache    A headache is pain or discomfort felt around the head or neck area. The specific cause of a headache may not be found as there are many types including tension headaches, migraine headaches, and cluster headaches. Watch your condition for any changes. Things you can do to manage your pain include taking over the counter and prescription medications as instructed by your health care provider, lying down in a dark quiet room, limiting stress, getting regular sleep, and refraining from alcohol and tobacco products.    SEEK IMMEDIATE MEDICAL CARE IF YOU HAVE ANY OF THE FOLLOWING SYMPTOMS: fever, vomiting, stiff neck, loss of vision, problems with speech, muscle weakness, loss of balance, trouble walking, passing out, or confusion.

## 2019-11-19 NOTE — ED PROVIDER NOTE - CLINICAL SUMMARY MEDICAL DECISION MAKING FREE TEXT BOX
pt seen for headache, labs and imaging without any acute findings, pt sx resolved in ED, reported feeling well to go home. advised close follow up with neuro and pt agreed. Strict return precautions advised and pt verbalized understanding.

## 2019-11-19 NOTE — ED PROVIDER NOTE - NSFOLLOWUPCLINICS_GEN_ALL_ED_FT
[facility]  Neurology  [address] [place] [address], [facility] [address]  [place] [address]  Phone: [phone]  Fax:   Follow Up Time: 1-3 Days

## 2019-11-19 NOTE — ED ADULT NURSE NOTE - NSIMPLEMENTINTERV_GEN_ALL_ED
Implemented All Universal Safety Interventions:  [place] to call system. Call bell, personal items and telephone within reach. Instruct patient to call for assistance. Room bathroom lighting operational. Non-slip footwear when patient is off stretcher. Physically safe environment: no spills, clutter or unnecessary equipment. Stretcher in lowest position, wheels locked, appropriate side rails in place.

## 2019-12-02 ENCOUNTER — APPOINTMENT (OUTPATIENT)
Dept: NEUROLOGY | Facility: CLINIC | Age: 24
End: 2019-12-02

## 2020-12-04 ENCOUNTER — EMERGENCY (EMERGENCY)
Facility: HOSPITAL | Age: 25
LOS: 0 days | Discharge: HOME | End: 2020-12-04
Attending: EMERGENCY MEDICINE | Admitting: EMERGENCY MEDICINE
Payer: SELF-PAY

## 2020-12-04 VITALS
OXYGEN SATURATION: 100 % | SYSTOLIC BLOOD PRESSURE: 135 MMHG | TEMPERATURE: 98 F | RESPIRATION RATE: 16 BRPM | HEIGHT: 70 IN | DIASTOLIC BLOOD PRESSURE: 59 MMHG | WEIGHT: 139.99 LBS | HEART RATE: 66 BPM

## 2020-12-04 DIAGNOSIS — Z98.890 OTHER SPECIFIED POSTPROCEDURAL STATES: Chronic | ICD-10-CM

## 2020-12-04 DIAGNOSIS — Z79.899 OTHER LONG TERM (CURRENT) DRUG THERAPY: ICD-10-CM

## 2020-12-04 DIAGNOSIS — R51.9 HEADACHE, UNSPECIFIED: ICD-10-CM

## 2020-12-04 PROCEDURE — L9991: CPT

## 2020-12-10 ENCOUNTER — EMERGENCY (EMERGENCY)
Facility: HOSPITAL | Age: 25
LOS: 0 days | Discharge: HOME | End: 2020-12-10
Attending: STUDENT IN AN ORGANIZED HEALTH CARE EDUCATION/TRAINING PROGRAM | Admitting: STUDENT IN AN ORGANIZED HEALTH CARE EDUCATION/TRAINING PROGRAM
Payer: SELF-PAY

## 2020-12-10 VITALS
RESPIRATION RATE: 18 BRPM | SYSTOLIC BLOOD PRESSURE: 135 MMHG | HEART RATE: 82 BPM | HEIGHT: 70 IN | DIASTOLIC BLOOD PRESSURE: 70 MMHG | WEIGHT: 139.99 LBS | TEMPERATURE: 99 F

## 2020-12-10 VITALS
TEMPERATURE: 99 F | DIASTOLIC BLOOD PRESSURE: 62 MMHG | RESPIRATION RATE: 18 BRPM | HEART RATE: 81 BPM | SYSTOLIC BLOOD PRESSURE: 137 MMHG | OXYGEN SATURATION: 100 %

## 2020-12-10 DIAGNOSIS — J45.909 UNSPECIFIED ASTHMA, UNCOMPLICATED: ICD-10-CM

## 2020-12-10 DIAGNOSIS — H93.11 TINNITUS, RIGHT EAR: ICD-10-CM

## 2020-12-10 DIAGNOSIS — G43.909 MIGRAINE, UNSPECIFIED, NOT INTRACTABLE, WITHOUT STATUS MIGRAINOSUS: ICD-10-CM

## 2020-12-10 DIAGNOSIS — Z98.890 OTHER SPECIFIED POSTPROCEDURAL STATES: Chronic | ICD-10-CM

## 2020-12-10 PROCEDURE — 70470 CT HEAD/BRAIN W/O & W/DYE: CPT | Mod: 26

## 2020-12-10 PROCEDURE — 99284 EMERGENCY DEPT VISIT MOD MDM: CPT

## 2020-12-10 RX ORDER — SODIUM CHLORIDE 9 MG/ML
1000 INJECTION, SOLUTION INTRAVENOUS ONCE
Refills: 0 | Status: COMPLETED | OUTPATIENT
Start: 2020-12-10 | End: 2020-12-10

## 2020-12-10 RX ORDER — METOCLOPRAMIDE HCL 10 MG
10 TABLET ORAL ONCE
Refills: 0 | Status: COMPLETED | OUTPATIENT
Start: 2020-12-10 | End: 2020-12-10

## 2020-12-10 RX ORDER — PROCHLORPERAZINE MALEATE 5 MG
1 TABLET ORAL
Qty: 45 | Refills: 0
Start: 2020-12-10 | End: 2020-12-24

## 2020-12-10 RX ADMIN — SODIUM CHLORIDE 1000 MILLILITER(S): 9 INJECTION, SOLUTION INTRAVENOUS at 20:18

## 2020-12-10 NOTE — ED ADULT NURSE NOTE - OBJECTIVE STATEMENT
Pt is a 25y Male c/o of Migraines x2 weeks. Pt states 2 years ago he had a tumor removed from his head. He states that the migraine he is feeling is what he felt 2 years ago. Pt states today was worst and decided to come in. Pt denies any weakness/changes to speech or vision/any balance changes.

## 2020-12-10 NOTE — ED PROVIDER NOTE - NSFOLLOWUPINSTRUCTIONS_ED_ALL_ED_FT
Should you have similar symptoms/headache again, you should try taking an over the counter Non steroidal anti inflammatory drug (NSAID). Should you symptoms persist for a long time or worsen, call your Primary Care Doctor or come to the ER. If you notice you are having more than 8 episodes of headache or aura related symptoms (visual disturbances, nausea, numbness/tingling), we recommend you see a neurologist. It is important to know that having migraines with aura symptoms means you are more likely to have a stroke, if you have these type of migraines do not discount when you have serious symptoms, seek evaluation at once.

## 2020-12-10 NOTE — ED PROVIDER NOTE - NS ED ROS FT
Constitutional:  No fevers or chills.  Eyes:  No visual changes, eye pain, or discharge.  ENT:  (+) tinnitus. No sore throat.  Neck:  No neck pain or stiffness.  Cardiac:  No CP or edema.  Resp:  No cough or SOB. No hemoptysis.   GI:  No nausea, vomiting, diarrhea, or abdominal pain.  :  No dysuria, frequency, or hematuria.  MSK:  No myalgias or joint pain/swelling.  Neuro:  (+) headache. No dizziness or weakness.  Skin:  No skin rash.

## 2020-12-10 NOTE — ED PROVIDER NOTE - OBJECTIVE STATEMENT
25 y male with PMH of Brain tumor S/p resection 2 years ago and asthma presents with unilateral throbbing right sided HA x7 days not relieved with Tylenol or NSAIDS with associated ringing in the ear and is worse with chewing.  patient states he had a similar HA one year prior where he was seen at this ED and was relieved with fluids and raglan.  patient also states he had a similar HA prior to diagnosis of brain mass and was concerned about recurrence of mass.  Denies blurry vision, fever, dysphagia, dysarthria, weakness, dizziness, N/V, CP, SOB, ABD pain.

## 2020-12-10 NOTE — ED PROVIDER NOTE - CLINICAL SUMMARY MEDICAL DECISION MAKING FREE TEXT BOX
25M with PMH benign brain tumor s/p resection 2yrs ago, asthma who p/w throbbing R sided headache x7 days, not relieved with tylenol or excedrin migraine. Assoc with tinnitus of R ear and exacerbated with chewing. Denies hearing loss, vision changes, n/v, neck pain, focal weakness or numbness. Reports similar Sx when he was diagnosed with brain mass and was concerned. Gen - NAD, Head - NCAT, EOMI, TMs - clear b/l, Pharynx - clear, MMM, Heart - RRR, no m/g/r, Lungs - CTAB, no w/c/r, Abdomen - soft, NT, ND, Skin - No rash, Extremities - FROM, no edema, erythema, ecchymosis, Neuro - CN 2-12 intact, nl strength and sensation, nl gait. HCT showed s/p left frontal craniotomy with underlying calcific foci and linear encephalomalacia extending from the cortex to the left frontal horn. No acute pathology to explain Sx. Reglan, IVF given and pt reports interval improvement. Given non-improvement with OTC meds (tylenol/NSAIDs), will give compazine x7d and f/u with neurology. I have fully discussed the medical management and delivery of care with the patient. I have discussed any available labs, imaging and treatment options with the patient. All Questions answered at the bedside. Patient confirms understanding and has been given detailed return precautions. Patient instructed to return to the ED should symptoms persist or worsen. Patient has demonstrated capacity and has verbalized understanding. Patient is well appearing upon discharge, ambulatory with a steady gait.

## 2020-12-10 NOTE — ED ADULT NURSE NOTE - CHPI ED NUR SYMPTOMS NEG
no weakness/no fever/no loss of consciousness/no vomiting/no nausea/no numbness/no blurred vision/no change in level of consciousness/no confusion/no dizziness

## 2020-12-10 NOTE — ED PROVIDER NOTE - PATIENT PORTAL LINK FT
You can access the FollowMyHealth Patient Portal offered by [facility] by registering at the following website: [email or web address]. By joining [employer]’s FollowMyHealth portal, you will also be able to view your health information using other applications (apps) compatible with our system.

## 2020-12-11 RX ORDER — PROCHLORPERAZINE MALEATE 5 MG
1 TABLET ORAL
Qty: 21 | Refills: 0
Start: 2020-12-11 | End: 2020-12-17

## 2021-02-26 NOTE — ED ADULT NURSE NOTE - PAIN: DESCRIPTION (FREQUENCY/QUALITY)
Nutrition: RD will add Quirino BID to current usual tube feeding formula order.
If Hyperkalemia does not resolve today, REC consider change formula to Pivot
1.5 at 55 mL/hr to provide less K+ and meet wound healing needs without need
for Quirino. Carb content acceptable. intermittent

## 2021-03-18 ENCOUNTER — TRANSCRIPTION ENCOUNTER (OUTPATIENT)
Age: 26
End: 2021-03-18

## 2022-02-15 NOTE — PATIENT PROFILE ADULT - NSSTREETDRUGFR_GEN_A_NUR
[id number]   Handling Specimen POC :   Capillary    :   Roche   POC Test Comments :   Please contact patient with directions.   Joan Herrera RN - 7/16/2019 10:47 AM CDT  
no fever/no tingling/no numbness/no weakness/no back pain/no abrasion
daily

## 2022-05-15 ENCOUNTER — EMERGENCY (EMERGENCY)
Facility: HOSPITAL | Age: 27
LOS: 0 days | Discharge: HOME | End: 2022-05-15
Attending: STUDENT IN AN ORGANIZED HEALTH CARE EDUCATION/TRAINING PROGRAM | Admitting: STUDENT IN AN ORGANIZED HEALTH CARE EDUCATION/TRAINING PROGRAM
Payer: COMMERCIAL

## 2022-05-15 VITALS
DIASTOLIC BLOOD PRESSURE: 70 MMHG | HEIGHT: 70 IN | SYSTOLIC BLOOD PRESSURE: 132 MMHG | TEMPERATURE: 98 F | WEIGHT: 145.06 LBS | HEART RATE: 68 BPM | RESPIRATION RATE: 18 BRPM | OXYGEN SATURATION: 99 %

## 2022-05-15 VITALS
HEART RATE: 66 BPM | RESPIRATION RATE: 18 BRPM | SYSTOLIC BLOOD PRESSURE: 128 MMHG | DIASTOLIC BLOOD PRESSURE: 70 MMHG | OXYGEN SATURATION: 99 %

## 2022-05-15 DIAGNOSIS — R51.9 HEADACHE, UNSPECIFIED: ICD-10-CM

## 2022-05-15 DIAGNOSIS — G43.909 MIGRAINE, UNSPECIFIED, NOT INTRACTABLE, WITHOUT STATUS MIGRAINOSUS: ICD-10-CM

## 2022-05-15 DIAGNOSIS — Z86.011 PERSONAL HISTORY OF BENIGN NEOPLASM OF THE BRAIN: ICD-10-CM

## 2022-05-15 DIAGNOSIS — J45.909 UNSPECIFIED ASTHMA, UNCOMPLICATED: ICD-10-CM

## 2022-05-15 DIAGNOSIS — Z98.890 OTHER SPECIFIED POSTPROCEDURAL STATES: Chronic | ICD-10-CM

## 2022-05-15 LAB
ALBUMIN SERPL ELPH-MCNC: 4.3 G/DL — SIGNIFICANT CHANGE UP (ref 3.5–5.2)
ALP SERPL-CCNC: 55 U/L — SIGNIFICANT CHANGE UP (ref 30–115)
ALT FLD-CCNC: 13 U/L — SIGNIFICANT CHANGE UP (ref 0–41)
ANION GAP SERPL CALC-SCNC: 11 MMOL/L — SIGNIFICANT CHANGE UP (ref 7–14)
AST SERPL-CCNC: 19 U/L — SIGNIFICANT CHANGE UP (ref 0–41)
BASOPHILS # BLD AUTO: 0.02 K/UL — SIGNIFICANT CHANGE UP (ref 0–0.2)
BASOPHILS NFR BLD AUTO: 0.3 % — SIGNIFICANT CHANGE UP (ref 0–1)
BILIRUB SERPL-MCNC: 0.5 MG/DL — SIGNIFICANT CHANGE UP (ref 0.2–1.2)
BUN SERPL-MCNC: 10 MG/DL — SIGNIFICANT CHANGE UP (ref 10–20)
CALCIUM SERPL-MCNC: 9.6 MG/DL — SIGNIFICANT CHANGE UP (ref 8.5–10.1)
CHLORIDE SERPL-SCNC: 103 MMOL/L — SIGNIFICANT CHANGE UP (ref 98–110)
CO2 SERPL-SCNC: 25 MMOL/L — SIGNIFICANT CHANGE UP (ref 17–32)
CREAT SERPL-MCNC: 1 MG/DL — SIGNIFICANT CHANGE UP (ref 0.7–1.5)
EGFR: 104 ML/MIN/1.73M2 — SIGNIFICANT CHANGE UP
EOSINOPHIL # BLD AUTO: 0.12 K/UL — SIGNIFICANT CHANGE UP (ref 0–0.7)
EOSINOPHIL NFR BLD AUTO: 2 % — SIGNIFICANT CHANGE UP (ref 0–8)
GLUCOSE SERPL-MCNC: 105 MG/DL — HIGH (ref 70–99)
HCT VFR BLD CALC: 37.2 % — LOW (ref 42–52)
HGB BLD-MCNC: 13.2 G/DL — LOW (ref 14–18)
IMM GRANULOCYTES NFR BLD AUTO: 0.2 % — SIGNIFICANT CHANGE UP (ref 0.1–0.3)
LYMPHOCYTES # BLD AUTO: 2.61 K/UL — SIGNIFICANT CHANGE UP (ref 1.2–3.4)
LYMPHOCYTES # BLD AUTO: 42.6 % — SIGNIFICANT CHANGE UP (ref 20.5–51.1)
MAGNESIUM SERPL-MCNC: 2.1 MG/DL — SIGNIFICANT CHANGE UP (ref 1.8–2.4)
MCHC RBC-ENTMCNC: 30.3 PG — SIGNIFICANT CHANGE UP (ref 27–31)
MCHC RBC-ENTMCNC: 35.5 G/DL — SIGNIFICANT CHANGE UP (ref 32–37)
MCV RBC AUTO: 85.3 FL — SIGNIFICANT CHANGE UP (ref 80–94)
MONOCYTES # BLD AUTO: 0.36 K/UL — SIGNIFICANT CHANGE UP (ref 0.1–0.6)
MONOCYTES NFR BLD AUTO: 5.9 % — SIGNIFICANT CHANGE UP (ref 1.7–9.3)
NEUTROPHILS # BLD AUTO: 3 K/UL — SIGNIFICANT CHANGE UP (ref 1.4–6.5)
NEUTROPHILS NFR BLD AUTO: 49 % — SIGNIFICANT CHANGE UP (ref 42.2–75.2)
NRBC # BLD: 0 /100 WBCS — SIGNIFICANT CHANGE UP (ref 0–0)
PLATELET # BLD AUTO: 200 K/UL — SIGNIFICANT CHANGE UP (ref 130–400)
POTASSIUM SERPL-MCNC: 3.9 MMOL/L — SIGNIFICANT CHANGE UP (ref 3.5–5)
POTASSIUM SERPL-SCNC: 3.9 MMOL/L — SIGNIFICANT CHANGE UP (ref 3.5–5)
PROT SERPL-MCNC: 6.3 G/DL — SIGNIFICANT CHANGE UP (ref 6–8)
RBC # BLD: 4.36 M/UL — LOW (ref 4.7–6.1)
RBC # FLD: 11.9 % — SIGNIFICANT CHANGE UP (ref 11.5–14.5)
SODIUM SERPL-SCNC: 139 MMOL/L — SIGNIFICANT CHANGE UP (ref 135–146)
WBC # BLD: 6.12 K/UL — SIGNIFICANT CHANGE UP (ref 4.8–10.8)
WBC # FLD AUTO: 6.12 K/UL — SIGNIFICANT CHANGE UP (ref 4.8–10.8)

## 2022-05-15 PROCEDURE — 99285 EMERGENCY DEPT VISIT HI MDM: CPT

## 2022-05-15 PROCEDURE — 70450 CT HEAD/BRAIN W/O DYE: CPT | Mod: 26,MA

## 2022-05-15 RX ORDER — METOCLOPRAMIDE HCL 10 MG
10 TABLET ORAL ONCE
Refills: 0 | Status: COMPLETED | OUTPATIENT
Start: 2022-05-15 | End: 2022-05-15

## 2022-05-15 RX ORDER — SODIUM CHLORIDE 9 MG/ML
1000 INJECTION, SOLUTION INTRAVENOUS ONCE
Refills: 0 | Status: COMPLETED | OUTPATIENT
Start: 2022-05-15 | End: 2022-05-15

## 2022-05-15 RX ORDER — ACETAMINOPHEN 500 MG
975 TABLET ORAL ONCE
Refills: 0 | Status: COMPLETED | OUTPATIENT
Start: 2022-05-15 | End: 2022-05-15

## 2022-05-15 RX ORDER — DIPHENHYDRAMINE HCL 50 MG
50 CAPSULE ORAL ONCE
Refills: 0 | Status: COMPLETED | OUTPATIENT
Start: 2022-05-15 | End: 2022-05-15

## 2022-05-15 RX ADMIN — SODIUM CHLORIDE 1000 MILLILITER(S): 9 INJECTION, SOLUTION INTRAVENOUS at 02:38

## 2022-05-15 NOTE — ED ADULT TRIAGE NOTE - CHIEF COMPLAINT QUOTE
Pt c/o right sided headache that woke him from his sleeping and causing nausea  hx migraines, not on meds since 2018  hx brain tumor with surg in 2018

## 2022-05-15 NOTE — ED PROVIDER NOTE - OBJECTIVE STATEMENT
26 yo male with hx of migraines, brain mass removed in 2018 presenting for headache that woke him up out of sleep this evening. Headache is right sided, denies n/v. 28 yo male with hx of migraines, brain mass removed in 2018 presenting for headache that woke him up out of sleep this evening. Headache is right sided, denies n/v. No dizziness or weakness.

## 2022-05-15 NOTE — ED PROVIDER NOTE - PATIENT PORTAL LINK FT
You can access the FollowMyHealth Patient Portal offered by [facility] by registering at the following website: [email or web address]. By joining [employer]’s FollowMyHealth portal, you will also be able to view your health information using other applications (apps) compatible with our system.

## 2022-05-15 NOTE — ED ADULT NURSE NOTE - NSICDXPASTMEDICALHX_GEN_ALL_CORE_FT
PAST MEDICAL HISTORY:  Brain tumor Left    Mild intermittent asthma without complication     Seizure

## 2022-05-15 NOTE — ED PROVIDER NOTE - CLINICAL SUMMARY MEDICAL DECISION MAKING FREE TEXT BOX
I personally evaluated the patient. I reviewed the Resident´s or Physician Assistant´s note (as assigned above), and agree with the findings and plan except as documented in my note.  Patient evaluated for headache.  Head CT, labs performed in the ED.  Patient given medication for headache with improvement in symptoms.  Patient neurologically intact, ambulatory in the ED with steady gait.  Patient instructed to follow-up with his neurologist.  I have fully discussed the medical management and delivery of care with the patient. I have discussed any available labs, imaging and treatment options with the patient. Patient confirms understanding and has been given detailed return precautions. Patient instructed to return to the ED should symptoms persist or worsen. Patient has demonstrated capacity and has verbalized understanding. Patient is well appearing upon discharge.

## 2022-05-15 NOTE — ED ADULT NURSE NOTE - OBJECTIVE STATEMENT
Patient reports migraine that woke him from sleep, came to ED because he was concerned. Patient states he is already starting to feel better at this time.

## 2022-05-15 NOTE — ED PROVIDER NOTE - CARE PROVIDER_API CALL
Silver Alvarez)  EEGEpilepsy; Neurology  [address] [place] [address], [address]  [place] [address]  Phone: [phone]  Fax: [phone]  Follow Up Time: 1-3 Days

## 2022-05-15 NOTE — ED ADULT NURSE NOTE - NSIMPLEMENTINTERV_GEN_ALL_ED
Implemented All Universal Safety Interventions:  [place] to call system. Call bell, personal items and telephone within reach. Instruct patient to call for assistance. Room bathroom lighting operational. Non-slip footwear when patient is off stretcher. Physically safe environment: no spills, clutter or unnecessary equipment. Stretcher in lowest position, wheels locked, appropriate side rails in place.

## 2022-05-15 NOTE — ED PROVIDER NOTE - ATTENDING CONTRIBUTION TO CARE
27-year-old male past medical history of migraines, benign brain mass removed in 2018 presents with headache.  Right-sided headache that woke him out of sleep within the past hour.  No associated symptoms.  No nausea/vomiting/diarrhea, no neck pain/stiffness, no blurry vision, no head trauma, no dizziness, no lightheadedness, no palpitations, no chest pain, no shortness of breath, no abdominal pain, no hearing changes, no weakness, no numbness/tingling.    CONSTITUTIONAL: Well-developed; well-nourished; in no acute distress. Sitting up and providing appropriate history and physical examination  SKIN: skin exam is warm and dry, no acute rash.  HEAD: Normocephalic; atraumatic.  EYES: PERRL, 3 mm bilateral, no nystagmus, EOM intact; conjunctiva and sclera clear.  ENT: No nasal discharge; airway clear.  NECK: Supple; non tender. + full passive ROM in all directions. No JVD  CARD: S1, S2 normal; no murmurs, gallops, or rubs. Regular rate and rhythm. + Symmetric Strong Pulses  RESP: No wheezes, rales or rhonchi. Good air movement bilaterally  ABD: soft; non-distended; non-tender. No Rebound, No Guarding, No signs of peritonitis, No CVA tenderness. No pulsatile abdominal mass. + Strong and Symmetric Pulses  EXT: Normal ROM. No clubbing, cyanosis or edema. Dp and Pt Pulses intact. Cap refill less than 3 seconds  NEURO: CN 2-12 intact, normal finger to nose, normal romberg, stable gait, no sensory or motor deficits, Alert, oriented, grossly unremarkable. No Focal deficits. GCS 15. NIH 0  PSYCH: Cooperative, appropriate.

## 2022-06-03 ENCOUNTER — EMERGENCY (EMERGENCY)
Facility: HOSPITAL | Age: 27
LOS: 0 days | Discharge: HOME | End: 2022-06-03
Attending: EMERGENCY MEDICINE | Admitting: EMERGENCY MEDICINE
Payer: COMMERCIAL

## 2022-06-03 VITALS
DIASTOLIC BLOOD PRESSURE: 76 MMHG | HEART RATE: 82 BPM | OXYGEN SATURATION: 99 % | RESPIRATION RATE: 18 BRPM | TEMPERATURE: 98 F | SYSTOLIC BLOOD PRESSURE: 132 MMHG

## 2022-06-03 VITALS
TEMPERATURE: 98 F | RESPIRATION RATE: 18 BRPM | HEART RATE: 88 BPM | HEIGHT: 70 IN | DIASTOLIC BLOOD PRESSURE: 85 MMHG | OXYGEN SATURATION: 99 % | SYSTOLIC BLOOD PRESSURE: 140 MMHG | WEIGHT: 154.98 LBS

## 2022-06-03 DIAGNOSIS — R51.9 HEADACHE, UNSPECIFIED: ICD-10-CM

## 2022-06-03 DIAGNOSIS — Z86.011 PERSONAL HISTORY OF BENIGN NEOPLASM OF THE BRAIN: ICD-10-CM

## 2022-06-03 DIAGNOSIS — Z98.890 OTHER SPECIFIED POSTPROCEDURAL STATES: Chronic | ICD-10-CM

## 2022-06-03 PROCEDURE — 99283 EMERGENCY DEPT VISIT LOW MDM: CPT

## 2022-06-03 RX ORDER — DEXAMETHASONE 0.5 MG/5ML
10 ELIXIR ORAL ONCE
Refills: 0 | Status: COMPLETED | OUTPATIENT
Start: 2022-06-03 | End: 2022-06-03

## 2022-06-03 RX ADMIN — Medication 10 MILLIGRAM(S): at 14:57

## 2022-06-03 NOTE — ED PROVIDER NOTE - OBJECTIVE STATEMENT
The pt is a 27y M w/ hx of chronic headache, brain benign neoplasm s/p resection 2018, presenting to ED for MRI. Headache is no different today compared to other days. Had normal CT head 5/15/22. Has an appointment with Neurology on 6/13/22. Denies blurry vision, N/V.

## 2022-06-03 NOTE — ED PROVIDER NOTE - CLINICAL SUMMARY MEDICAL DECISION MAKING FREE TEXT BOX
No distress.  Neuro no focal.  Patient refuses labs/IV meds.  Steroids given one time dose in ED.  D/C home to f/u with Neuro.

## 2022-06-03 NOTE — ED PROVIDER NOTE - PATIENT PORTAL LINK FT
You can access the FollowMyHealth Patient Portal offered by [facility] by registering at the following website: [email or web address]. By joining [employer]’s FollowMyHealth portal, you will also be able to view your health information using other applications (apps) compatible with our system.

## 2022-06-03 NOTE — ED PROVIDER NOTE - NSFOLLOWUPINSTRUCTIONS_ED_ALL_ED_FT
Follow up with your Neurologist on 6/13/2022.     Headache    A headache is pain or discomfort felt around the head or neck area. The specific cause of a headache may not be found as there are many types including tension headaches, migraine headaches, and cluster headaches. Watch your condition for any changes. Things you can do to manage your pain include taking over the counter and prescription medications as instructed by your health care provider, lying down in a dark quiet room, limiting stress, getting regular sleep, and refraining from alcohol and tobacco products.    SEEK IMMEDIATE MEDICAL CARE IF YOU EXPERIENCE THE FOLLOWING SYMPTOMS: fever, vomiting, stiff neck, loss of vision, problems with speech, muscle weakness, loss of balance, trouble walking, pass out, or confusion.

## 2022-06-03 NOTE — ED PROVIDER NOTE - PROGRESS NOTE DETAILS
Headache is not bad right now, pt declines IV for fluid/meds. Pt has appointment with Neurology in 10 days. Ready for dc.

## 2022-06-15 ENCOUNTER — APPOINTMENT (OUTPATIENT)
Dept: NEUROLOGY | Facility: CLINIC | Age: 27
End: 2022-06-15
Payer: COMMERCIAL

## 2022-06-15 VITALS
BODY MASS INDEX: 21.12 KG/M2 | OXYGEN SATURATION: 99 % | TEMPERATURE: 97.5 F | SYSTOLIC BLOOD PRESSURE: 118 MMHG | HEIGHT: 70 IN | DIASTOLIC BLOOD PRESSURE: 80 MMHG | WEIGHT: 147.56 LBS | HEART RATE: 88 BPM

## 2022-06-15 DIAGNOSIS — R56.9 UNSPECIFIED CONVULSIONS: ICD-10-CM

## 2022-06-15 PROCEDURE — 99204 OFFICE O/P NEW MOD 45 MIN: CPT

## 2022-06-16 ENCOUNTER — APPOINTMENT (OUTPATIENT)
Dept: NEUROLOGY | Facility: CLINIC | Age: 27
End: 2022-06-16
Payer: COMMERCIAL

## 2022-06-16 PROCEDURE — 95816 EEG AWAKE AND DROWSY: CPT

## 2022-06-16 NOTE — DISCUSSION/SUMMARY
[Risks Associated with Driving/[place] Law] : As per my usual protocol, the patient was advised in regards to risks and driving privileges associated with the [place] Guidelines.  [Safety Recommendations] : The patient was advised in regards to the risk of seizures and general seizure safety recommendations including not to be bathing alone, climbing to high places and operating heavy machinery. [Sleep Hygiene/Sleep Disruption Risks] : Sleep hygiene and the risks of sleep disruption were discussed. [Inpatient video EEG study ordered with length of stay anticipated in days: _____] : Inpatient video EEG study ordered with length of stay anticipated in days: [unfilled] [Evaluation for interictal epileptiform activity, subclinical seizures, and risk stratification (typically 2-3 days)] : Evaluation for interictal epileptiform activity, subclinical seizures, and risk stratification (typically 2-3 days) [FreeTextEntry1] : Adarsh is a 27 year old right handed Male with h/o left frontal craniotomy 8/2018 for  benign tumor resection and seizure presented for evaluation of a headache. Isa headache could be related to seizure and considering episodes of spacing out,  jaw pain and tongue biting pt will need a seizure work up. \par Plan:\par - REEG if normal will need VEEG\par - Advised against driving for now\par - Discussed importance of rest and sleep\par - pt will follow up after the work up is complete\par \par Case discussed with Dr. Wong\par \par Karena Rousseau, DNP, ACNP-BC
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
## 2022-06-24 ENCOUNTER — LABORATORY RESULT (OUTPATIENT)
Age: 27
End: 2022-06-24

## 2022-06-27 ENCOUNTER — INPATIENT (INPATIENT)
Facility: HOSPITAL | Age: 27
LOS: 1 days | Discharge: HOME | End: 2022-06-29
Attending: HOSPITALIST | Admitting: HOSPITALIST
Payer: COMMERCIAL

## 2022-06-27 VITALS — OXYGEN SATURATION: 98 %

## 2022-06-27 DIAGNOSIS — Z98.890 OTHER SPECIFIED POSTPROCEDURAL STATES: Chronic | ICD-10-CM

## 2022-06-27 LAB
ALBUMIN SERPL ELPH-MCNC: 4.8 G/DL — SIGNIFICANT CHANGE UP (ref 3.5–5.2)
ALP SERPL-CCNC: 52 U/L — SIGNIFICANT CHANGE UP (ref 30–115)
ALT FLD-CCNC: 11 U/L — SIGNIFICANT CHANGE UP (ref 0–41)
ANION GAP SERPL CALC-SCNC: 11 MMOL/L — SIGNIFICANT CHANGE UP (ref 7–14)
AST SERPL-CCNC: 17 U/L — SIGNIFICANT CHANGE UP (ref 0–41)
BILIRUB SERPL-MCNC: 0.7 MG/DL — SIGNIFICANT CHANGE UP (ref 0.2–1.2)
BUN SERPL-MCNC: 12 MG/DL — SIGNIFICANT CHANGE UP (ref 10–20)
CALCIUM SERPL-MCNC: 9.9 MG/DL — SIGNIFICANT CHANGE UP (ref 8.5–10.1)
CHLORIDE SERPL-SCNC: 99 MMOL/L — SIGNIFICANT CHANGE UP (ref 98–110)
CO2 SERPL-SCNC: 27 MMOL/L — SIGNIFICANT CHANGE UP (ref 17–32)
CREAT SERPL-MCNC: 1.2 MG/DL — SIGNIFICANT CHANGE UP (ref 0.7–1.5)
EGFR: 84 ML/MIN/1.73M2 — SIGNIFICANT CHANGE UP
GLUCOSE SERPL-MCNC: 97 MG/DL — SIGNIFICANT CHANGE UP (ref 70–99)
HCT VFR BLD CALC: 39.8 % — LOW (ref 42–52)
HGB BLD-MCNC: 13.6 G/DL — LOW (ref 14–18)
MAGNESIUM SERPL-MCNC: 2 MG/DL — SIGNIFICANT CHANGE UP (ref 1.8–2.4)
MCHC RBC-ENTMCNC: 29.4 PG — SIGNIFICANT CHANGE UP (ref 27–31)
MCHC RBC-ENTMCNC: 34.2 G/DL — SIGNIFICANT CHANGE UP (ref 32–37)
MCV RBC AUTO: 86.1 FL — SIGNIFICANT CHANGE UP (ref 80–94)
NRBC # BLD: 0 /100 WBCS — SIGNIFICANT CHANGE UP (ref 0–0)
PLATELET # BLD AUTO: 208 K/UL — SIGNIFICANT CHANGE UP (ref 130–400)
POTASSIUM SERPL-MCNC: 4.1 MMOL/L — SIGNIFICANT CHANGE UP (ref 3.5–5)
POTASSIUM SERPL-SCNC: 4.1 MMOL/L — SIGNIFICANT CHANGE UP (ref 3.5–5)
PROT SERPL-MCNC: 6.5 G/DL — SIGNIFICANT CHANGE UP (ref 6–8)
RBC # BLD: 4.62 M/UL — LOW (ref 4.7–6.1)
RBC # FLD: 12 % — SIGNIFICANT CHANGE UP (ref 11.5–14.5)
SODIUM SERPL-SCNC: 137 MMOL/L — SIGNIFICANT CHANGE UP (ref 135–146)
WBC # BLD: 6.38 K/UL — SIGNIFICANT CHANGE UP (ref 4.8–10.8)
WBC # FLD AUTO: 6.38 K/UL — SIGNIFICANT CHANGE UP (ref 4.8–10.8)

## 2022-06-27 PROCEDURE — 99221 1ST HOSP IP/OBS SF/LOW 40: CPT

## 2022-06-27 RX ORDER — CHLORHEXIDINE GLUCONATE 213 G/1000ML
1 SOLUTION TOPICAL DAILY
Refills: 0 | Status: DISCONTINUED | OUTPATIENT
Start: 2022-06-27 | End: 2022-06-29

## 2022-06-27 RX ORDER — ACETAMINOPHEN 500 MG
650 TABLET ORAL EVERY 6 HOURS
Refills: 0 | Status: DISCONTINUED | OUTPATIENT
Start: 2022-06-27 | End: 2022-06-29

## 2022-06-27 RX ORDER — LANOLIN ALCOHOL/MO/W.PET/CERES
3 CREAM (GRAM) TOPICAL AT BEDTIME
Refills: 0 | Status: DISCONTINUED | OUTPATIENT
Start: 2022-06-27 | End: 2022-06-27

## 2022-06-27 RX ORDER — ONDANSETRON 8 MG/1
4 TABLET, FILM COATED ORAL EVERY 8 HOURS
Refills: 0 | Status: DISCONTINUED | OUTPATIENT
Start: 2022-06-27 | End: 2022-06-29

## 2022-06-27 NOTE — CONSULT NOTE ADULT - NS ATTEND AMEND GEN_ALL_CORE FT
Agree with the hX and the plan  Adarsh is here for risk assessment further characterization  of the episodic events described as HA and also at times being described as having staring spell and also episodes  of waking up with sore tongue   He does have slight risk factor   will start the V-EEG  NO AED  Seizure precaution

## 2022-06-27 NOTE — H&P ADULT - NSHPREVIEWOFSYSTEMS_GEN_ALL_CORE
GENERAL: No fever, no chills  HEENT: no eye pain, (+)floaters, (+) blurry vision,  no neck pain/stiffness  RESPIRATORY: No cough,  No Shortness of Breath  CARDIOVASCULAR: no chest pain, no palpitation  GASTROINTESTINAL: No abdominal  pain. No nausea, vomiting, diarrhea.  GENITOURINARY: No dysuria, frequency, hematuria, or incontinence  NEUROLOGICAL: (+)headaches  SKIN: No itching, burning, rashes, or lesions

## 2022-06-27 NOTE — H&P ADULT - HISTORY OF PRESENT ILLNESS
27y old male with pmhx of seizure, headache, benign brain tumor s/p resection 2018 presents to medical floor sent by neurologist for routine VEEG to r/o seizures. pt states hx of chronic intermittent headaches prior to and after brain surgery. pt states headache subsided for a while but reoccured in may/june constant right sided headache associated with floaters/blurry vision. and relieved with excedrin and steroids given in ED. Pt states had an episode of seizure after brain surgery, had EEG in 2019 placed on keppra but stopped taking it about 1yr ago.  Pt admits to insomnia and occasional THC use but denies chest/abdominal pain, SOB, n/v, fever/chills, LUTS or LOC.

## 2022-06-27 NOTE — PATIENT PROFILE ADULT - FALL HARM RISK - HARM RISK INTERVENTIONS
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
## 2022-06-27 NOTE — H&P ADULT - ASSESSMENT
27y old male with pmhx of seizure, headache, benign brain tumor s/p resection 2018 presents to medical floor sent by neurologist for routine VEEG to r/o seizures. pt states hx of chronic intermittent headaches prior to and after brain surgery. pt states headache subsided for a while but reoccured in may/june constant right sided headache associated with floaters/blurry vision. and relieved with excedrin and steroids given in ED. Pt states had an episode of seizure after brain surgery, had EEG in 2019 placed on keppra but stopped taking it about 1yr ago.  Pt admits to insomnia and occasional THC use but denies chest/abdominal pain, SOB, n/v, fever/chills, LUTS or LOC.    #VEEG  #R/O seizures  - admit to med-surg  - labs/ecg/c-xray  - seizure precautions  - neurology consult  - GI/DVT prophylaxis  - monitor vss  - monitor pt

## 2022-06-27 NOTE — H&P ADULT - NSHPPHYSICALEXAM_GEN_ALL_CORE
PHYSICAL EXAM:  GENERAL: axox3, NAD, well-developed  HEAD:  +Left sided old incision, Normocephalic  EYES: EOMI, PERRLA, conjunctiva and sclera clear  NECK: Supple, No JVD  CHEST/LUNG: Clear to auscultation bilaterally; No wheeze  HEART: Regular rate and rhythm; s1,s2  ABDOMEN: Soft, Nontender, Nondistended; Bowel sounds present  EXTREMITIES:  2+ Peripheral Pulses, No clubbing, cyanosis, or edema  PSYCH: AAOx3  NEUROLOGY: non-focal  SKIN: No rashes or lesions

## 2022-06-28 PROCEDURE — 99231 SBSQ HOSP IP/OBS SF/LOW 25: CPT

## 2022-06-28 PROCEDURE — 95720 EEG PHY/QHP EA INCR W/VEEG: CPT

## 2022-06-28 NOTE — PROGRESS NOTE ADULT - SUBJECTIVE AND OBJECTIVE BOX
27y old male with pmhx of seizure, headache, benign brain tumor s/p resection 2018 presents to medical floor sent by neurologist for routine VEEG to r/o seizures    Today:  Seen at bedside, no complaints.          REVIEW OF SYSTEMS:  No complaints      MEDICATIONS  (STANDING):  chlorhexidine 4% Liquid 1 Application(s) Topical daily    MEDICATIONS  (PRN):  acetaminophen     Tablet .. 650 milliGRAM(s) Oral every 6 hours PRN Temp greater or equal to 38C (100.4F), Mild Pain (1 - 3)  aluminum hydroxide/magnesium hydroxide/simethicone Suspension 30 milliLiter(s) Oral every 4 hours PRN Dyspepsia  LORazepam   Injectable 2 milliGRAM(s) IV Push three times a day PRN generalized tonic-clonic seizure lasting longer than 2 minutes  ondansetron Injectable 4 milliGRAM(s) IV Push every 8 hours PRN Nausea and/or Vomiting      Allergies  No Known Allergies          Vital Signs Last 24 Hrs  T(C): 36.7 (28 Jun 2022 13:12), Max: 36.9 (27 Jun 2022 21:22)  T(F): 98 (28 Jun 2022 13:12), Max: 98.5 (27 Jun 2022 21:22)  HR: 65 (28 Jun 2022 13:12) (44 - 68)  BP: 101/53 (28 Jun 2022 13:12) (101/53 - 118/61)  RR: 18 (28 Jun 2022 13:12) (16 - 18)  SpO2: 97% (28 Jun 2022 05:54) (97% - 97%)    PHYSICAL EXAM:  GENERAL: NAD, well-groomed, well-developed  HEAD:  Atraumatic, Normocephalic  EYES: EOMI, PERRLA, conjunctiva and sclera clear  ENMT: No tonsillar erythema, exudates, or enlargement; Moist mucous membranes, Good dentition, No lesions  NECK: Supple, No JVD, Normal thyroid  NERVOUS SYSTEM:  Alert & Oriented X3, Good concentration  CHEST/LUNG: CTA bilaterally; No rales, rhonchi, wheezing, or rubs  HEART: Regular rate and rhythm; No murmurs, rubs, or gallops  ABDOMEN: Soft, Nontender, Nondistended; Bowel sounds present  EXTREMITIES:  2+ Peripheral Pulses, No clubbing, cyanosis, or edema      LABS:                        13.6   6.38  )-----------( 208      ( 27 Jun 2022 15:16 )             39.8     06-27    137  |  99  |  12  ----------------------------<  97  4.1   |  27  |  1.2    Ca    9.9      27 Jun 2022 15:16  Mg     2.0     06-27    TPro  6.5  /  Alb  4.8  /  TBili  0.7  /  DBili  x   /  AST  17  /  ALT  11  /  AlkPhos  52  06-27

## 2022-06-28 NOTE — PROGRESS NOTE ADULT - SUBJECTIVE AND OBJECTIVE BOX
Epilepsy Attending Note:     DANIELA ROSADO    27y Male  MRN [id number]    Vital Signs Last 24 Hrs  T(C): 36.8 (28 Jun 2022 04:40), Max: 36.9 (27 Jun 2022 21:22)  T(F): 98.3 (28 Jun 2022 04:40), Max: 98.5 (27 Jun 2022 21:22)  HR: 61 (28 Jun 2022 05:54) (44 - 68)  BP: 107/57 (28 Jun 2022 04:40) (107/57 - 118/61)  BP(mean): --  RR: 18 (28 Jun 2022 04:40) (16 - 18)  SpO2: 97% (28 Jun 2022 05:54) (97% - 98%)                          13.6   6.38  )-----------( 208      ( 27 Jun 2022 15:16 )             39.8       06-27    137  |  99  |  12  ----------------------------<  97  4.1   |  27  |  1.2    Ca    9.9      27 Jun 2022 15:16  Mg     2.0     06-27    TPro  6.5  /  Alb  4.8  /  TBili  0.7  /  DBili  x   /  AST  17  /  ALT  11  /  AlkPhos  52  06-27      MEDICATIONS  (STANDING):  chlorhexidine 4% Liquid 1 Application(s) Topical daily    MEDICATIONS  (PRN):  acetaminophen     Tablet .. 650 milliGRAM(s) Oral every 6 hours PRN Temp greater or equal to 38C (100.4F), Mild Pain (1 - 3)  aluminum hydroxide/magnesium hydroxide/simethicone Suspension 30 milliLiter(s) Oral every 4 hours PRN Dyspepsia  LORazepam   Injectable 2 milliGRAM(s) IV Push three times a day PRN generalized tonic-clonic seizure lasting longer than 2 minutes  ondansetron Injectable 4 milliGRAM(s) IV Push every 8 hours PRN Nausea and/or Vomiting            VEEG in the last 24 hours:    Background--------------- continues, symmetrical , less than optimally organized reaching 6-7 hz    Focal and generalized slowing---- mild to moderate generalized slowing---------mild to moderate left hemispheric focal slowing    Interictal activity------------  large  number of right FT sharp waves.  and also moderate number of independent left FC sharp waves    Events-------none    Seizures----none    Impression:  abnormal as above    Plan - The levels done out side are not reliable ( timing and changes in the dose). The level done in the hospital was also as it appears done after missing doses on Sunday ?           Will do a trough level           Will speak with the Nurse at the NH to identify types and frequency of the seizures. Will also inquire from the Neurology about the sequence of the changes of AED.          will readjust the doses by the end of the day after having the info.          seizure precaution     Epilepsy Attending Note:     DANIELA ROSADO    27y Male  MRN [id number]    Vital Signs Last 24 Hrs  T(C): 36.8 (28 Jun 2022 04:40), Max: 36.9 (27 Jun 2022 21:22)  T(F): 98.3 (28 Jun 2022 04:40), Max: 98.5 (27 Jun 2022 21:22)  HR: 61 (28 Jun 2022 05:54) (44 - 68)  BP: 107/57 (28 Jun 2022 04:40) (107/57 - 118/61)  BP(mean): --  RR: 18 (28 Jun 2022 04:40) (16 - 18)  SpO2: 97% (28 Jun 2022 05:54) (97% - 98%)                          13.6   6.38  )-----------( 208      ( 27 Jun 2022 15:16 )             39.8       06-27    137  |  99  |  12  ----------------------------<  97  4.1   |  27  |  1.2    Ca    9.9      27 Jun 2022 15:16  Mg     2.0     06-27    TPro  6.5  /  Alb  4.8  /  TBili  0.7  /  DBili  x   /  AST  17  /  ALT  11  /  AlkPhos  52  06-27      MEDICATIONS  (STANDING):  chlorhexidine 4% Liquid 1 Application(s) Topical daily    MEDICATIONS  (PRN):  acetaminophen     Tablet .. 650 milliGRAM(s) Oral every 6 hours PRN Temp greater or equal to 38C (100.4F), Mild Pain (1 - 3)  aluminum hydroxide/magnesium hydroxide/simethicone Suspension 30 milliLiter(s) Oral every 4 hours PRN Dyspepsia  LORazepam   Injectable 2 milliGRAM(s) IV Push three times a day PRN generalized tonic-clonic seizure lasting longer than 2 minutes  ondansetron Injectable 4 milliGRAM(s) IV Push every 8 hours PRN Nausea and/or Vomiting            VEEG in the last 24 hours:    Background - continuous, symmetrica, well organized, reaching frequencies in the range of 8-9 Hz    Focal and generalized slowing - none    Interictal activity - none    Events - none    Seizures - none    Impression: Normal VEEG x 24 hrs.    Plan -   Continue VEEG monitoring  Seizure precautions  Sleep deprive tonight  Discussed with patient

## 2022-06-28 NOTE — PROGRESS NOTE ADULT - ASSESSMENT
27y old male with pmhx of seizure, headache, benign brain tumor s/p resection 2018 presents to medical floor sent by neurologist for routine VEEG to r/o seizures. pt states hx of chronic intermittent headaches prior to and after brain surgery. pt states headache subsided for a while but reoccured in may/june constant right sided headache associated with floaters/blurry vision. and relieved with excedrin and steroids given in ED. Pt states had an episode of seizure after brain surgery, had EEG in 2019 placed on keppra but stopped taking it about 1yr ago.  Pt admits to insomnia and occasional THC use but denies chest/abdominal pain, SOB, n/v, fever/chills, LUTS or LOC.    #VEEG  #R/O seizures  - seizure precautions  - neurology consult

## 2022-06-29 ENCOUNTER — TRANSCRIPTION ENCOUNTER (OUTPATIENT)
Age: 27
End: 2022-06-29

## 2022-06-29 VITALS
SYSTOLIC BLOOD PRESSURE: 109 MMHG | RESPIRATION RATE: 16 BRPM | DIASTOLIC BLOOD PRESSURE: 58 MMHG | HEART RATE: 58 BPM | TEMPERATURE: 97 F

## 2022-06-29 PROCEDURE — 99231 SBSQ HOSP IP/OBS SF/LOW 25: CPT

## 2022-06-29 PROCEDURE — 95720 EEG PHY/QHP EA INCR W/VEEG: CPT

## 2022-06-29 PROCEDURE — 99238 HOSP IP/OBS DSCHRG MGMT 30/<: CPT

## 2022-06-29 PROCEDURE — ZZZZZ: CPT

## 2022-06-29 NOTE — DISCHARGE NOTE PROVIDER - HOSPITAL COURSE
VEEG monitoring completed, patient is cleared for discharge from neurology standpoint.    Follow up neurology appointment is scheduled with Dr. Wong   for October 11, 2022 at 1 pm (with JANET)    [address] [place] [address], [address]  [phone]    Detailed instructions regarding follow up plan are given to the patient, all questions answered. Patient verbalized understanding.

## 2022-06-29 NOTE — DISCHARGE NOTE PROVIDER - NSDCCPCAREPLAN_GEN_ALL_CORE_FT
PRINCIPAL DISCHARGE DIAGNOSIS  Diagnosis: Convulsion  Assessment and Plan of Treatment: Please follow with your neurologist as scheduled.

## 2022-06-29 NOTE — DISCHARGE NOTE PROVIDER - CARE PROVIDER_API CALL
Ray Wong)  Neurology  [address] [place] [address], [address] [place] [address]  Phone: [phone]  Fax: [phone]  Scheduled Appointment: 10/11/2022 01:00 PM

## 2022-06-29 NOTE — PROGRESS NOTE ADULT - SUBJECTIVE AND OBJECTIVE BOX
Epilepsy Attending Note:     DANIELA ROSADO    27y Male  MRN [id number]    Vital Signs Last 24 Hrs  T(C): 35.9 (29 Jun 2022 04:57), Max: 37.1 (28 Jun 2022 21:00)  T(F): 96.6 (29 Jun 2022 04:57), Max: 98.7 (28 Jun 2022 21:00)  HR: 58 (29 Jun 2022 04:57) (58 - 65)  BP: 109/58 (29 Jun 2022 04:57) (101/53 - 112/68)  BP(mean): --  RR: 16 (29 Jun 2022 04:57) (16 - 18)  SpO2: --                          13.6   6.38  )-----------( 208      ( 27 Jun 2022 15:16 )             39.8       06-27    137  |  99  |  12  ----------------------------<  97  4.1   |  27  |  1.2    Ca    9.9      27 Jun 2022 15:16  Mg     2.0     06-27    TPro  6.5  /  Alb  4.8  /  TBili  0.7  /  DBili  x   /  AST  17  /  ALT  11  /  AlkPhos  52  06-27      MEDICATIONS  (STANDING):  chlorhexidine 4% Liquid 1 Application(s) Topical daily    MEDICATIONS  (PRN):  acetaminophen     Tablet .. 650 milliGRAM(s) Oral every 6 hours PRN Temp greater or equal to 38C (100.4F), Mild Pain (1 - 3)  aluminum hydroxide/magnesium hydroxide/simethicone Suspension 30 milliLiter(s) Oral every 4 hours PRN Dyspepsia  LORazepam   Injectable 2 milliGRAM(s) IV Push three times a day PRN generalized tonic-clonic seizure lasting longer than 2 minutes  ondansetron Injectable 4 milliGRAM(s) IV Push every 8 hours PRN Nausea and/or Vomiting          VEEG in the last 24 hours:    Background - continuous, symmetrica, well organized, reaching frequencies in the range of 8-9 Hz    Focal and generalized slowing - none    Interictal activity - none    Events - patient reported a typical right-sided headache associated with nausea last night prior to falling asleep. This event was not associated with any epileptiform EEG abnormalities.    Seizures none    Impression: Normal VEEG x 48 hrs.     Plan -   Will d/c monitoring  Follow up as scheduled with EEG  Call the office if any issues  Will not recommend seizure medications

## 2022-06-29 NOTE — PROGRESS NOTE ADULT - SUBJECTIVE AND OBJECTIVE BOX
Epilepsy Team Discharge Note:    VEEG monitoring completed, patient is cleared for discharge from neurology standpoint.    Follow up neurology appointment is scheduled with Dr. Wong   for October 11, 2022 at 1 pm (with JANETG)    [address] [place] [address], [address]  [phone]    Detailed instructions regarding follow up plan are given to the patient, all questions answered. Patient verbalized understanding.    Discussed with medical and nursing teams.

## 2022-06-29 NOTE — DISCHARGE NOTE NURSING/CASE MANAGEMENT/SOCIAL WORK - PATIENT PORTAL LINK FT
You can access the FollowMyHealth Patient Portal offered by [facility] by registering at the following website: [email or web address]. By joining [employer]’s FollowMyHealth portal, you will also be able to view your health information using other applications (apps) compatible with our system.

## 2022-06-29 NOTE — DISCHARGE NOTE NURSING/CASE MANAGEMENT/SOCIAL WORK - NSDCPEFALRISK_GEN_ALL_CORE
For information on Fall & Injury Prevention, visit: [email or web address] OR  [email or web address] OR  https://www.cdc.gov/steadi/patient.html

## 2022-07-05 DIAGNOSIS — J45.20 MILD INTERMITTENT ASTHMA, UNCOMPLICATED: ICD-10-CM

## 2022-07-05 DIAGNOSIS — R56.9 UNSPECIFIED CONVULSIONS: ICD-10-CM

## 2022-10-11 ENCOUNTER — APPOINTMENT (OUTPATIENT)
Dept: NEUROLOGY | Facility: CLINIC | Age: 27
End: 2022-10-11

## 2022-10-17 NOTE — ED PROVIDER NOTE - CARE PLAN
1 Principal Discharge DX:	Headache

## 2023-01-25 NOTE — ED ADULT NURSE NOTE - NS ED NOTE ABUSE RESPONSE YN
01/25/23 1634   IMM Letter   IMM Letter given to Patient/Family/Significant other/Guardian/POA/by: Ayde Plasencia RN Case Mgr   IMM Letter date given: 01/25/23   IMM Letter time given: 1620     An Important Message From Ayde know our Inpatient hospital rights regarding discharge document discussed w/ Henny Tiwari and her . Both verbalized understanding.  Signed copy given to patient and original placed in her blue folder Yes

## 2023-03-05 ENCOUNTER — EMERGENCY (EMERGENCY)
Facility: HOSPITAL | Age: 28
LOS: 0 days | Discharge: HOME | End: 2023-03-05
Attending: EMERGENCY MEDICINE
Payer: SELF-PAY

## 2023-03-05 VITALS
HEIGHT: 70 IN | HEART RATE: 63 BPM | TEMPERATURE: 98 F | SYSTOLIC BLOOD PRESSURE: 116 MMHG | OXYGEN SATURATION: 99 % | DIASTOLIC BLOOD PRESSURE: 70 MMHG | RESPIRATION RATE: 16 BRPM | WEIGHT: 149.91 LBS

## 2023-03-05 VITALS
HEART RATE: 60 BPM | DIASTOLIC BLOOD PRESSURE: 65 MMHG | RESPIRATION RATE: 18 BRPM | SYSTOLIC BLOOD PRESSURE: 121 MMHG | OXYGEN SATURATION: 99 % | TEMPERATURE: 98 F

## 2023-03-05 DIAGNOSIS — R11.0 NAUSEA: ICD-10-CM

## 2023-03-05 DIAGNOSIS — Z98.890 OTHER SPECIFIED POSTPROCEDURAL STATES: Chronic | ICD-10-CM

## 2023-03-05 DIAGNOSIS — R51.9 HEADACHE, UNSPECIFIED: ICD-10-CM

## 2023-03-05 PROCEDURE — 99284 EMERGENCY DEPT VISIT MOD MDM: CPT

## 2023-03-05 PROCEDURE — 99284 EMERGENCY DEPT VISIT MOD MDM: CPT | Mod: 25

## 2023-03-05 PROCEDURE — 70450 CT HEAD/BRAIN W/O DYE: CPT | Mod: MA

## 2023-03-05 PROCEDURE — 70450 CT HEAD/BRAIN W/O DYE: CPT | Mod: 26,MA

## 2023-03-05 RX ORDER — IBUPROFEN 200 MG
600 TABLET ORAL ONCE
Refills: 0 | Status: COMPLETED | OUTPATIENT
Start: 2023-03-05 | End: 2023-03-05

## 2023-03-05 RX ORDER — METOCLOPRAMIDE HCL 10 MG
10 TABLET ORAL ONCE
Refills: 0 | Status: COMPLETED | OUTPATIENT
Start: 2023-03-05 | End: 2023-03-05

## 2023-03-05 NOTE — ED PROVIDER NOTE - NS ED ATTENDING STATEMENT MOD
This was a shared visit with the FE. I reviewed and verified the documentation and independently performed the documented:

## 2023-03-05 NOTE — ED PROVIDER NOTE - CARE PROVIDER_API CALL
Ray Wong)  Neurology  [address] [place] [address], [address] [place] [address]  Phone: [phone]  Fax: [phone]  Follow Up Time:

## 2023-03-05 NOTE — ED PROVIDER NOTE - ATTENDING APP SHARED VISIT CONTRIBUTION OF CARE
28-year-old male past medical history of benign brain tumor removed in 2018, seizures off of Keppra after last EEG was normal, presents with headache x3 days.  Frontal right-sided sharp pressure constant nonradiating.  With light sensitivity and nausea.  No vomiting or LOC.  No trauma.  No blood thinners.  No fever cough neck stiffness altered mental status.  No numbness weakness blurry vision slurred speech.    On exam, AFVSS, Well appearing, No acute distress, NCAT, well-healed scar to left frontal forehead, EOMI, PERRLA, MMM, Neck supple, LCTAB, RRR nl s1s2 No mrg, Abdomen Soft NTND, aaox3, CN 2-12 intact, No nystagmus.  5/5 motor x 4 ext, SILT x 4 extremities, No facial droop or slurred speech. No pronator drift.  Normal rapid alternating movement and finger nose finger bilaterally. No midline C/T/L tenderness to palpation or step off. Normal gait, No ataxia.  No LE edema or calf TTP,    A/P; headache, will get CT head, treat pain reeval, neurovascularly intact

## 2023-03-05 NOTE — ED PROVIDER NOTE - OBJECTIVE STATEMENT
28 year old M with history of benign brain tumor status postresection in 2018, seizure not  currently on seizure medications, chronic intermittent headaches presenting to ER with 3 days of right-sided headache.  Pain is constant, pressure-like sts worse when he is trying to sleep, + associated nausea.  No trauma, injuries, anticoagulant use, fever, chills, neck pain or stiffness, dizziness/room spinning sensation, chest pain, shortness of breath, lightheadedness, syncope.

## 2023-03-05 NOTE — ED PROVIDER NOTE - CONDITION AT DISCHARGE:
Dear Anette Victoria,     My name is LORENZO Duval, and I recently had the pleasure of evaluating information related to your medical condition on our [employer] digital platform. Based on the information available, I have determined that your request for care was unable to be safely and effectively completed. Due to this, it is my recommendation that you seek care at a local Urgent Care or [address] for an expedited and thorough face-to-face evaluation and consideration for diagnostic testing. Please note that the [employer] department that you submitted your request for care is equivalent to a virtual format of Urgent Care or Immediate [facility] level of care. If you did not intend to seek this particular level of care and potentially had a scheduled appointment with another provider, we recommend that you directly call the office of the provider you are attempting to meet with to rectify connection issues. Your care is very important to us. Please do not delay in acting on the recommendations for your care listed above. If you feel you may be experiencing a medical emergency, contact [facility] immediately. If you have any questions regarding your visit, you may contact us at [phone].      Thank You,     LORENZO Duval
Satisfactory

## 2023-03-05 NOTE — ED ADULT TRIAGE NOTE - CHIEF COMPLAINT QUOTE
Pt c/o headaches over past week. no fevers, reports nausea and light sensitivity .  pt reports hx brain tumor removal in past

## 2023-03-05 NOTE — ED ADULT NURSE NOTE - OBJECTIVE STATEMENT
29 y/o male alert and oriented x 3  presented to ed for headaches over past week. no fevers, reports nausea and light sensitivity .  pt reports hx brain tumor removal in past

## 2023-03-05 NOTE — ED PROVIDER NOTE - PATIENT PORTAL LINK FT
You can access the FollowMyHealth Patient Portal offered by [facility] by registering at the following website: [email or web address]. By joining [employer]’s FollowMyHealth portal, you will also be able to view your health information using other applications (apps) compatible with our system.

## 2023-03-05 NOTE — ED PROVIDER NOTE - CLINICAL SUMMARY MEDICAL DECISION MAKING FREE TEXT BOX
HA completely resolved w po reglan and ibuprofen, wants to go home. CTH unremarkable. advised close f/u with his neurologist as outpt 1-2 weeks for MRI as outpt, strict return precautions provided.

## 2023-03-05 NOTE — ED PROVIDER NOTE - PHYSICAL EXAMINATION
CONSTITUTIONAL: Well-appearing; well-nourished; in no apparent distress.   EYES: PERRL; EOM intact.   ENT: normal nose; no rhinorrhea; normal pharynx with no tonsillar hypertrophy.   NECK: Supple; non-tender; no cervical lymphadenopathy.   CARDIOVASCULAR: Normal S1, S2; no murmurs, rubs, or gallops. Equal radial pulses  RESPIRATORY: Normal chest excursion with respiration; breath sounds clear and equal bilaterally; no wheezes, rhonchi, or rales.  GI/: Normal bowel sounds; non-distended; non-tender; no palpable organomegaly.   MS: No evidence of trauma or deformity. Normal ROM in all four extremities; non-tender to palpation; distal pulses are normal.   SKIN: Normal for age and race; warm; dry; good turgor; no apparent lesions or exudate.   NEURO/PSYCH: A & O x 4; grossly unremarkable. mood and manner are appropriate. Grooming and personal hygiene are appropriate. No focal deficits.  No facial droop.  No tongue deviation.  Cerebellar intact.  Normal gait.  Sensation intact.

## 2024-09-17 NOTE — ED ADULT NURSE NOTE - SKIN TURGOR
Your Diagnosis is: Viral infection    Return to the Emergency department for any other issues or concerns.    Your new prescriptions are: none    Procedures done today: Chest X-ray, Viral testing, strep testing    Additional instructions: follow up with your doctor        resilient/elastic

## 2024-10-16 NOTE — ED ADULT TRIAGE NOTE - MODE OF ARRIVAL
General: Appearance is consistent with chronological age. No abnormal facies.  Constitutional: Alert and in no acute distress.  Eyes: The sclera and conjunctiva were normal, pupils were equal in size, round, and reactive to light and extraocular movements were intact.   ENT: The ears and nose were normal in appearance; oropharynx clear, moist mucus membranes.  Neck: The appearance of the neck was normal, no neck mass was observed. There was no jugular-venous distention.   Airway:  See Mallampati score.  Cardiovascular:  Regular rate and rhythm.  Respiratory: Unlabored breathing.  Neurological: No focal deficit, moves all extremities.  Psychiatric: Oriented to person, place, and time, insight and judgment were intact and the affect was normal.  Exercise Tolerance: STONER
Walk in

## 2025-01-24 NOTE — ED ADULT NURSE NOTE - CAS ELECT INFOMATION PROVIDED
Pls call pt, no message    1.  DM control is good w/ A1c at 6.4.  no sign of diabetes affecting kidney fn.    2.  Chol:  LDL at goal w/ lova 10, but TG's remain elevated.  Not high enough for specific TG meds, but getting close.  Watch simple carbs like rice.  Levels are not bad enough to warrant nutritionist.     3.  Vit D is good on 2000 units.    4.  Thyroid, liver tests are good    5.  Urine shows microscopic blood, not seen before.  Recommend repeating urine in the next 2-3 wks.  If persistent blood, need to do further w/u.  If no blood , need to do third urine test.  DC instructions

## 2025-02-07 NOTE — ED ADULT TRIAGE NOTE - NSWEIGHTCALCTOOLDRUG_GEN_A_CORE
----- Message from Giles Pimentel DO sent at 2/6/2025  5:27 PM CST -----  CMP, TSH and A1C are normal.     Cholesterol and CBC could not be preformed. I can reorder labs and have the patient complete at his convenience.     used

## 2025-03-19 ENCOUNTER — EMERGENCY (EMERGENCY)
Facility: HOSPITAL | Age: 30
LOS: 0 days | Discharge: HOME | End: 2025-03-19
Attending: EMERGENCY MEDICINE
Payer: MEDICAID

## 2025-03-19 VITALS
WEIGHT: 153 LBS | RESPIRATION RATE: 18 BRPM | DIASTOLIC BLOOD PRESSURE: 71 MMHG | SYSTOLIC BLOOD PRESSURE: 121 MMHG | HEART RATE: 53 BPM | TEMPERATURE: 98 F | OXYGEN SATURATION: 99 %

## 2025-03-19 DIAGNOSIS — Z85.841 PERSONAL HISTORY OF MALIGNANT NEOPLASM OF BRAIN: ICD-10-CM

## 2025-03-19 DIAGNOSIS — D64.9 ANEMIA, UNSPECIFIED: ICD-10-CM

## 2025-03-19 DIAGNOSIS — R51.9 HEADACHE, UNSPECIFIED: ICD-10-CM

## 2025-03-19 DIAGNOSIS — Z98.890 OTHER SPECIFIED POSTPROCEDURAL STATES: Chronic | ICD-10-CM

## 2025-03-19 DIAGNOSIS — Z86.19 PERSONAL HISTORY OF OTHER INFECTIOUS AND PARASITIC DISEASES: ICD-10-CM

## 2025-03-19 DIAGNOSIS — R56.9 UNSPECIFIED CONVULSIONS: ICD-10-CM

## 2025-03-19 LAB
ALBUMIN SERPL ELPH-MCNC: 4.3 G/DL — SIGNIFICANT CHANGE UP (ref 3.5–5.2)
ALP SERPL-CCNC: 42 U/L — SIGNIFICANT CHANGE UP (ref 30–115)
ALT FLD-CCNC: 9 U/L — SIGNIFICANT CHANGE UP (ref 0–41)
ANION GAP SERPL CALC-SCNC: 11 MMOL/L — SIGNIFICANT CHANGE UP (ref 7–14)
AST SERPL-CCNC: 26 U/L — SIGNIFICANT CHANGE UP (ref 0–41)
BASOPHILS # BLD AUTO: 0.01 K/UL — SIGNIFICANT CHANGE UP (ref 0–0.2)
BASOPHILS NFR BLD AUTO: 0.2 % — SIGNIFICANT CHANGE UP (ref 0–1)
BILIRUB SERPL-MCNC: 0.8 MG/DL — SIGNIFICANT CHANGE UP (ref 0.2–1.2)
BUN SERPL-MCNC: 11 MG/DL — SIGNIFICANT CHANGE UP (ref 10–20)
CALCIUM SERPL-MCNC: 9.1 MG/DL — SIGNIFICANT CHANGE UP (ref 8.4–10.5)
CHLORIDE SERPL-SCNC: 104 MMOL/L — SIGNIFICANT CHANGE UP (ref 98–110)
CO2 SERPL-SCNC: 25 MMOL/L — SIGNIFICANT CHANGE UP (ref 17–32)
CREAT SERPL-MCNC: 0.9 MG/DL — SIGNIFICANT CHANGE UP (ref 0.7–1.5)
EGFR: 116 ML/MIN/1.73M2 — SIGNIFICANT CHANGE UP
EGFR: 116 ML/MIN/1.73M2 — SIGNIFICANT CHANGE UP
EOSINOPHIL # BLD AUTO: 0.03 K/UL — SIGNIFICANT CHANGE UP (ref 0–0.7)
EOSINOPHIL NFR BLD AUTO: 0.5 % — SIGNIFICANT CHANGE UP (ref 0–8)
GLUCOSE SERPL-MCNC: 88 MG/DL — SIGNIFICANT CHANGE UP (ref 70–99)
HCT VFR BLD CALC: 37.7 % — LOW (ref 42–52)
HGB BLD-MCNC: 12.9 G/DL — LOW (ref 14–18)
IMM GRANULOCYTES NFR BLD AUTO: 0.3 % — SIGNIFICANT CHANGE UP (ref 0.1–0.3)
LYMPHOCYTES # BLD AUTO: 1.99 K/UL — SIGNIFICANT CHANGE UP (ref 1.2–3.4)
LYMPHOCYTES # BLD AUTO: 30.8 % — SIGNIFICANT CHANGE UP (ref 20.5–51.1)
MAGNESIUM SERPL-MCNC: 1.9 MG/DL — SIGNIFICANT CHANGE UP (ref 1.8–2.4)
MCHC RBC-ENTMCNC: 29.9 PG — SIGNIFICANT CHANGE UP (ref 27–31)
MCHC RBC-ENTMCNC: 34.2 G/DL — SIGNIFICANT CHANGE UP (ref 32–37)
MCV RBC AUTO: 87.3 FL — SIGNIFICANT CHANGE UP (ref 80–94)
MONOCYTES # BLD AUTO: 0.23 K/UL — SIGNIFICANT CHANGE UP (ref 0.1–0.6)
MONOCYTES NFR BLD AUTO: 3.6 % — SIGNIFICANT CHANGE UP (ref 1.7–9.3)
NEUTROPHILS # BLD AUTO: 4.19 K/UL — SIGNIFICANT CHANGE UP (ref 1.4–6.5)
NEUTROPHILS NFR BLD AUTO: 64.6 % — SIGNIFICANT CHANGE UP (ref 42.2–75.2)
NRBC BLD AUTO-RTO: 0 /100 WBCS — SIGNIFICANT CHANGE UP (ref 0–0)
PLATELET # BLD AUTO: 211 K/UL — SIGNIFICANT CHANGE UP (ref 130–400)
PMV BLD: 8.9 FL — SIGNIFICANT CHANGE UP (ref 7.4–10.4)
POTASSIUM SERPL-MCNC: 4.7 MMOL/L — SIGNIFICANT CHANGE UP (ref 3.5–5)
POTASSIUM SERPL-SCNC: 4.7 MMOL/L — SIGNIFICANT CHANGE UP (ref 3.5–5)
PROT SERPL-MCNC: 6.6 G/DL — SIGNIFICANT CHANGE UP (ref 6–8)
RBC # BLD: 4.32 M/UL — LOW (ref 4.7–6.1)
RBC # FLD: 12 % — SIGNIFICANT CHANGE UP (ref 11.5–14.5)
SODIUM SERPL-SCNC: 140 MMOL/L — SIGNIFICANT CHANGE UP (ref 135–146)
WBC # BLD: 6.47 K/UL — SIGNIFICANT CHANGE UP (ref 4.8–10.8)
WBC # FLD AUTO: 6.47 K/UL — SIGNIFICANT CHANGE UP (ref 4.8–10.8)

## 2025-03-19 PROCEDURE — 82962 GLUCOSE BLOOD TEST: CPT

## 2025-03-19 PROCEDURE — 99284 EMERGENCY DEPT VISIT MOD MDM: CPT | Mod: 25

## 2025-03-19 PROCEDURE — 99284 EMERGENCY DEPT VISIT MOD MDM: CPT

## 2025-03-19 PROCEDURE — 36415 COLL VENOUS BLD VENIPUNCTURE: CPT

## 2025-03-19 PROCEDURE — 83735 ASSAY OF MAGNESIUM: CPT

## 2025-03-19 PROCEDURE — 70450 CT HEAD/BRAIN W/O DYE: CPT | Mod: MC

## 2025-03-19 PROCEDURE — 70450 CT HEAD/BRAIN W/O DYE: CPT | Mod: 26

## 2025-03-19 PROCEDURE — 96375 TX/PRO/DX INJ NEW DRUG ADDON: CPT

## 2025-03-19 PROCEDURE — 85025 COMPLETE CBC W/AUTO DIFF WBC: CPT

## 2025-03-19 PROCEDURE — 80053 COMPREHEN METABOLIC PANEL: CPT

## 2025-03-19 PROCEDURE — 96374 THER/PROPH/DIAG INJ IV PUSH: CPT

## 2025-03-19 RX ORDER — ACETAMINOPHEN 500 MG/5ML
975 LIQUID (ML) ORAL ONCE
Refills: 0 | Status: COMPLETED | OUTPATIENT
Start: 2025-03-19 | End: 2025-03-19

## 2025-03-19 RX ORDER — METOCLOPRAMIDE HCL 10 MG
10 TABLET ORAL ONCE
Refills: 0 | Status: COMPLETED | OUTPATIENT
Start: 2025-03-19 | End: 2025-03-19

## 2025-03-19 RX ORDER — KETOROLAC TROMETHAMINE 30 MG/ML
30 INJECTION, SOLUTION INTRAMUSCULAR; INTRAVENOUS ONCE
Refills: 0 | Status: DISCONTINUED | OUTPATIENT
Start: 2025-03-19 | End: 2025-03-19

## 2025-03-19 RX ORDER — SODIUM CHLORIDE 9 G/1000ML
1000 INJECTION, SOLUTION INTRAVENOUS ONCE
Refills: 0 | Status: COMPLETED | OUTPATIENT
Start: 2025-03-19 | End: 2025-03-19

## 2025-03-19 RX ADMIN — Medication 975 MILLIGRAM(S): at 14:43

## 2025-03-19 RX ADMIN — Medication 104 MILLIGRAM(S): at 13:22

## 2025-03-19 RX ADMIN — SODIUM CHLORIDE 1000 MILLILITER(S): 9 INJECTION, SOLUTION INTRAVENOUS at 13:23

## 2025-03-19 RX ADMIN — KETOROLAC TROMETHAMINE 30 MILLIGRAM(S): 30 INJECTION, SOLUTION INTRAMUSCULAR; INTRAVENOUS at 14:43

## 2025-03-19 NOTE — ED PROVIDER NOTE - ATTENDING CONTRIBUTION TO CARE
30-year-old man with h/o benign brain tumor (pt not sure what) s/p resection 2018, migraines, in ER with c/o HA which started  a few hours ago.  Having pain to R side of head associated with nausea, photophobia, eye tearing.  No vomiting.  + Similar in character to prior headaches, but more intense.  No syncope/near syncope.  No neck pain.  No F/C.  No difficulty with speech or ambulation.  No weakness or paresthesias.  No CP/SOB.  No abdominal pain.  PE - nad, nc/at, eomi, perrl, op - clear, mmm, pharyngeal erythema, neck supple, cta b/l, no w/r/r, rrr, abd- soft, nt/nd, nabs, from x 4, no LE swelling/tenderness, A&O x 3, cn 2-12 intact, motor 5/5 b/l UE and LE, sensation intact x 4, FTN/HKS intact b/l  -ivf, reglan, check labs, ct head, re-eval. 30-year-old man with h/o benign brain tumor (pt not sure what) s/p resection 2018,  seizure after brain surgery no longer on AED, migraines, in ER with c/o HA which started  a few hours ago.  Having pain to R side of head associated with nausea, photophobia, eye tearing.  No vomiting.  + Similar in character to prior headaches, but more intense.  No syncope/near syncope.  No neck pain.  No F/C.  No difficulty with speech or ambulation.  No weakness or paresthesias.  No CP/SOB.  No abdominal pain.  PE - nad, nc/at, eomi, perrl, op - clear, mmm, pharyngeal erythema, neck supple, cta b/l, no w/r/r, rrr, abd- soft, nt/nd, nabs, from x 4, no LE swelling/tenderness, A&O x 3, cn 2-12 intact, motor 5/5 b/l UE and LE, sensation intact x 4, FTN/HKS intact b/l  -ivf, reglan, check labs, ct head, re-eval. 30-year-old man with h/o benign brain tumor (pt not sure what) s/p resection 2018,  seizure after brain surgery no longer on AED, migraines, in ER with c/o HA which started  a few hours ago.  Having pain to R side of head associated with nausea, photophobia, eye tearing.  No vomiting.  + Similar in character to prior headaches, but more intense.  No syncope/near syncope.  No neck pain.  No F/C.  No difficulty with speech or ambulation.  No weakness or paresthesias.  No CP/SOB.  No abdominal pain. Was seen in ER 2 years ago for similar complaints.   PE - nad, nc/at, eomi, perrl, op - clear, mmm, pharyngeal erythema, neck supple, cta b/l, no w/r/r, rrr, abd- soft, nt/nd, nabs, from x 4, no LE swelling/tenderness, A&O x 3, cn 2-12 intact, motor 5/5 b/l UE and LE, sensation intact x 4, FTN/HKS intact b/l  -ivf, reglan, check labs, ct head, re-eval.

## 2025-03-19 NOTE — ED ADULT TRIAGE NOTE - CHIEF COMPLAINT QUOTE
Presented to ED c/o headache and photosensitivity and nausea starting today. Has h/o tumor removal on head in 2018.

## 2025-03-19 NOTE — ED PROVIDER NOTE - CONSIDERATION OF ADMISSION OBSERVATION
Hospitalization was considered for this patient, but the evaluation/workup did not indicate that hospitalization was necessary. Consideration of Admission/Observation

## 2025-03-19 NOTE — ED PROVIDER NOTE - NSFOLLOWUPINSTRUCTIONS_ED_ALL_ED_FT
Our Emergency Department Referral Coordinators will be reaching out to you in the next 24-48 hours from 9:00am to 5:00pm to schedule a follow up appointment. Please expect a phone call from the hospital in that time frame. If you do not receive a call or if you have any questions or concerns, you can reach them at   [phone].     Headache    A headache is pain or discomfort felt around the head or neck area. The specific cause of a headache may not be found as there are many types including tension headaches, migraine headaches, and cluster headaches. Watch your condition for any changes. Things you can do to manage your pain include taking over the counter and prescription medications as instructed by your health care provider, lying down in a dark quiet room, limiting stress, getting regular sleep, and refraining from alcohol and tobacco products.    SEEK IMMEDIATE MEDICAL CARE IF YOU EXPERIENCE THE FOLLOWING SYMPTOMS: fever, vomiting, stiff neck, loss of vision, problems with speech, muscle weakness, loss of balance, trouble walking, pass out, or confusion.

## 2025-03-19 NOTE — ED ADULT NURSE NOTE - OBJECTIVE STATEMENT
pt A/Ox4 c/o headache and photosensitivity and nausea starting today. Has h/o tumor removal on head in 2018.

## 2025-03-19 NOTE — ED PROVIDER NOTE - OBJECTIVE STATEMENT
29 yo M with pmhx of benign brain tumor status post resection in 2018 presenting for evaluation of pressure-like right sided headache that started 2 hours prior to arrival associated with photosensitivity and right eye tearing. No cp, sob, fever, chills, abdominal pain, nausea, vomiting, diarrhea, back pain, urinary symptoms, dizziness, paresthesias, or weakness.

## 2025-03-19 NOTE — ED PROVIDER NOTE - NS ED ATTENDING STATEMENT MOD
I have seen and examined this patient and fully participated in the care of this patient as the teaching attending.  The service was shared with the FE.  I reviewed and verified the documentation.

## 2025-03-19 NOTE — ED PROVIDER NOTE - PATIENT PORTAL LINK FT
You can access the FollowMyHealth Patient Portal offered by [facility] by registering at the following website: [email or web address]. By joining [facility]’s FollowMyHealth portal, you will also be able to view your health information using other applications (apps) compatible with our system.

## 2025-03-19 NOTE — ED ADULT NURSE NOTE - CHIEF COMPLAINT QUOTE
Presented to ED c/o headache and photosensitivity and nausea starting today. Has h/o tumor removal on head in 2018.
Statement Selected

## 2025-03-19 NOTE — ED PROVIDER NOTE - CLINICAL SUMMARY MEDICAL DECISION MAKING FREE TEXT BOX
30-year-old man with h/o benign brain tumor s/p resection, migraines, and ER with complaint of R sided HA.  + Similar to prior headaches.  Labs reviewed: CBC mild anemia (Hgb 12.9), CMP unremarkable.  Head CT with no acute pathology.  Patient given IVF, Reglan/Toradol.  On reevaluation, feeling much better.  HA now resolved.  Patient to follow-up with neurology and PMD for further evaluation of mild anemia.  Told to return to ER for worsening HA, dizziness, LOC, or any other new/concerning symptoms.  Patient understands and agrees with plan.  Given copy of all available results.